# Patient Record
Sex: MALE | Race: WHITE | Employment: OTHER | URBAN - METROPOLITAN AREA
[De-identification: names, ages, dates, MRNs, and addresses within clinical notes are randomized per-mention and may not be internally consistent; named-entity substitution may affect disease eponyms.]

---

## 2017-01-03 ENCOUNTER — TELEPHONE (OUTPATIENT)
Dept: INTERNAL MEDICINE CLINIC | Facility: CLINIC | Age: 67
End: 2017-01-03

## 2017-01-03 NOTE — TELEPHONE ENCOUNTER
Spoke with patient. He reports he was seeing urologist and mentioned abdominal discomfort. Urologist noted 2 hernia--large one on L and small one on R.  Patient states when he is not exercising he has no pain, but if he goes running it takes him about 5 day

## 2017-01-05 NOTE — TELEPHONE ENCOUNTER
Pt seen by urologist Dr Monica Mccall and told he has bilateral inguinal hernias (small on right, large on left);  Pt advised for general surgery referral to Dr Justyna Garcia; pt called

## 2017-01-06 PROBLEM — K40.20 BILATERAL INGUINAL HERNIA WITHOUT OBSTRUCTION OR GANGRENE, RECURRENCE NOT SPECIFIED: Status: ACTIVE | Noted: 2017-01-06

## 2017-01-23 RX ORDER — ACETAMINOPHEN 325 MG/1
650 TABLET ORAL ONCE
Status: DISCONTINUED | OUTPATIENT
Start: 2017-01-23 | End: 2017-01-23

## 2017-01-24 ENCOUNTER — ANESTHESIA EVENT (OUTPATIENT)
Dept: SURGERY | Facility: HOSPITAL | Age: 67
End: 2017-01-24
Payer: MEDICARE

## 2017-01-24 ENCOUNTER — HOSPITAL ENCOUNTER (OUTPATIENT)
Facility: HOSPITAL | Age: 67
Setting detail: HOSPITAL OUTPATIENT SURGERY
Discharge: HOME | End: 2017-01-24
Attending: SURGERY | Admitting: SURGERY
Payer: MEDICARE

## 2017-01-24 ENCOUNTER — ANESTHESIA (OUTPATIENT)
Dept: SURGERY | Facility: HOSPITAL | Age: 67
End: 2017-01-24
Payer: MEDICARE

## 2017-01-24 ENCOUNTER — SURGERY (OUTPATIENT)
Age: 67
End: 2017-01-24

## 2017-01-24 VITALS
BODY MASS INDEX: 30.52 KG/M2 | SYSTOLIC BLOOD PRESSURE: 121 MMHG | HEIGHT: 71 IN | OXYGEN SATURATION: 91 % | DIASTOLIC BLOOD PRESSURE: 65 MMHG | WEIGHT: 218 LBS | TEMPERATURE: 99 F | HEART RATE: 83 BPM | RESPIRATION RATE: 18 BRPM

## 2017-01-24 DIAGNOSIS — K40.20 BILATERAL INGUINAL HERNIA WITHOUT OBSTRUCTION OR GANGRENE, RECURRENCE NOT SPECIFIED: Primary | ICD-10-CM

## 2017-01-24 PROCEDURE — 0YUA0JZ SUPPLEMENT BILATERAL INGUINAL REGION WITH SYNTHETIC SUBSTITUTE, OPEN APPROACH: ICD-10-PCS | Performed by: SURGERY

## 2017-01-24 PROCEDURE — C9290 INJ, BUPIVACAINE LIPOSOME: HCPCS | Performed by: SURGERY

## 2017-01-24 DEVICE — BARD 3DMAX MESH RIGHT LARGE
Type: IMPLANTABLE DEVICE | Site: INGUINAL | Status: FUNCTIONAL
Brand: BARD 3DMAX MESH

## 2017-01-24 DEVICE — BARD 3DMAX MESH LEFT LARGE
Type: IMPLANTABLE DEVICE | Site: INGUINAL | Status: FUNCTIONAL
Brand: BARD 3DMAX MESH

## 2017-01-24 RX ORDER — GLYCOPYRROLATE 0.2 MG/ML
INJECTION INTRAMUSCULAR; INTRAVENOUS AS NEEDED
Status: DISCONTINUED | OUTPATIENT
Start: 2017-01-24 | End: 2017-01-24 | Stop reason: SURG

## 2017-01-24 RX ORDER — LIDOCAINE HYDROCHLORIDE 10 MG/ML
INJECTION, SOLUTION EPIDURAL; INFILTRATION; INTRACAUDAL; PERINEURAL AS NEEDED
Status: DISCONTINUED | OUTPATIENT
Start: 2017-01-24 | End: 2017-01-24 | Stop reason: SURG

## 2017-01-24 RX ORDER — MIDAZOLAM HYDROCHLORIDE 1 MG/ML
INJECTION INTRAMUSCULAR; INTRAVENOUS AS NEEDED
Status: DISCONTINUED | OUTPATIENT
Start: 2017-01-24 | End: 2017-01-24 | Stop reason: SURG

## 2017-01-24 RX ORDER — DEXAMETHASONE SODIUM PHOSPHATE 4 MG/ML
VIAL (ML) INJECTION AS NEEDED
Status: DISCONTINUED | OUTPATIENT
Start: 2017-01-24 | End: 2017-01-24 | Stop reason: SURG

## 2017-01-24 RX ORDER — MORPHINE SULFATE 4 MG/ML
4 INJECTION, SOLUTION INTRAMUSCULAR; INTRAVENOUS EVERY 2 HOUR PRN
Status: DISCONTINUED | OUTPATIENT
Start: 2017-01-24 | End: 2017-01-24

## 2017-01-24 RX ORDER — HEPARIN SODIUM 5000 [USP'U]/ML
5000 INJECTION, SOLUTION INTRAVENOUS; SUBCUTANEOUS ONCE
Status: COMPLETED | OUTPATIENT
Start: 2017-01-24 | End: 2017-01-24

## 2017-01-24 RX ORDER — MORPHINE SULFATE 4 MG/ML
6 INJECTION, SOLUTION INTRAMUSCULAR; INTRAVENOUS EVERY 2 HOUR PRN
Status: DISCONTINUED | OUTPATIENT
Start: 2017-01-24 | End: 2017-01-24

## 2017-01-24 RX ORDER — HYDROMORPHONE HYDROCHLORIDE 1 MG/ML
0.4 INJECTION, SOLUTION INTRAMUSCULAR; INTRAVENOUS; SUBCUTANEOUS EVERY 5 MIN PRN
Status: DISCONTINUED | OUTPATIENT
Start: 2017-01-24 | End: 2017-01-24

## 2017-01-24 RX ORDER — BUPIVACAINE HYDROCHLORIDE AND EPINEPHRINE 2.5; 5 MG/ML; UG/ML
INJECTION, SOLUTION INFILTRATION; PERINEURAL AS NEEDED
Status: DISCONTINUED | OUTPATIENT
Start: 2017-01-24 | End: 2017-01-24 | Stop reason: HOSPADM

## 2017-01-24 RX ORDER — MORPHINE SULFATE 2 MG/ML
2 INJECTION, SOLUTION INTRAMUSCULAR; INTRAVENOUS EVERY 2 HOUR PRN
Status: DISCONTINUED | OUTPATIENT
Start: 2017-01-24 | End: 2017-01-24

## 2017-01-24 RX ORDER — MORPHINE SULFATE 4 MG/ML
4 INJECTION, SOLUTION INTRAMUSCULAR; INTRAVENOUS EVERY 10 MIN PRN
Status: DISCONTINUED | OUTPATIENT
Start: 2017-01-24 | End: 2017-01-24

## 2017-01-24 RX ORDER — NALOXONE HYDROCHLORIDE 0.4 MG/ML
80 INJECTION, SOLUTION INTRAMUSCULAR; INTRAVENOUS; SUBCUTANEOUS AS NEEDED
Status: DISCONTINUED | OUTPATIENT
Start: 2017-01-24 | End: 2017-01-24

## 2017-01-24 RX ORDER — MORPHINE SULFATE 10 MG/ML
6 INJECTION, SOLUTION INTRAMUSCULAR; INTRAVENOUS EVERY 10 MIN PRN
Status: DISCONTINUED | OUTPATIENT
Start: 2017-01-24 | End: 2017-01-24

## 2017-01-24 RX ORDER — SODIUM CHLORIDE, SODIUM LACTATE, POTASSIUM CHLORIDE, CALCIUM CHLORIDE 600; 310; 30; 20 MG/100ML; MG/100ML; MG/100ML; MG/100ML
INJECTION, SOLUTION INTRAVENOUS CONTINUOUS
Status: DISCONTINUED | OUTPATIENT
Start: 2017-01-24 | End: 2017-01-24

## 2017-01-24 RX ORDER — HYDROCODONE BITARTRATE AND ACETAMINOPHEN 5; 325 MG/1; MG/1
1 TABLET ORAL AS NEEDED
Status: DISCONTINUED | OUTPATIENT
Start: 2017-01-24 | End: 2017-01-24

## 2017-01-24 RX ORDER — FAMOTIDINE 20 MG/1
20 TABLET ORAL ONCE
Status: COMPLETED | OUTPATIENT
Start: 2017-01-24 | End: 2017-01-24

## 2017-01-24 RX ORDER — LABETALOL HYDROCHLORIDE 5 MG/ML
INJECTION, SOLUTION INTRAVENOUS AS NEEDED
Status: DISCONTINUED | OUTPATIENT
Start: 2017-01-24 | End: 2017-01-24 | Stop reason: SURG

## 2017-01-24 RX ORDER — ACETAMINOPHEN 10 MG/ML
1000 INJECTION, SOLUTION INTRAVENOUS ONCE
Status: DISCONTINUED | OUTPATIENT
Start: 2017-01-24 | End: 2017-01-24 | Stop reason: HOSPADM

## 2017-01-24 RX ORDER — ACETAMINOPHEN 10 MG/ML
INJECTION, SOLUTION INTRAVENOUS AS NEEDED
Status: DISCONTINUED | OUTPATIENT
Start: 2017-01-24 | End: 2017-01-24 | Stop reason: SURG

## 2017-01-24 RX ORDER — DOCUSATE SODIUM 100 MG/1
100 CAPSULE, LIQUID FILLED ORAL 2 TIMES DAILY
Qty: 14 CAPSULE | Refills: 1 | Status: SHIPPED | OUTPATIENT
Start: 2017-01-24 | End: 2017-01-31

## 2017-01-24 RX ORDER — MORPHINE SULFATE 2 MG/ML
2 INJECTION, SOLUTION INTRAMUSCULAR; INTRAVENOUS EVERY 10 MIN PRN
Status: DISCONTINUED | OUTPATIENT
Start: 2017-01-24 | End: 2017-01-24

## 2017-01-24 RX ORDER — ROCURONIUM BROMIDE 10 MG/ML
INJECTION, SOLUTION INTRAVENOUS AS NEEDED
Status: DISCONTINUED | OUTPATIENT
Start: 2017-01-24 | End: 2017-01-24 | Stop reason: SURG

## 2017-01-24 RX ORDER — ONDANSETRON 2 MG/ML
4 INJECTION INTRAMUSCULAR; INTRAVENOUS ONCE AS NEEDED
Status: DISCONTINUED | OUTPATIENT
Start: 2017-01-24 | End: 2017-01-24

## 2017-01-24 RX ORDER — TRAMADOL HYDROCHLORIDE 50 MG/1
100 TABLET ORAL EVERY 6 HOURS PRN
Status: DISCONTINUED | OUTPATIENT
Start: 2017-01-24 | End: 2017-01-24

## 2017-01-24 RX ORDER — HYDROMORPHONE HYDROCHLORIDE 1 MG/ML
0.2 INJECTION, SOLUTION INTRAMUSCULAR; INTRAVENOUS; SUBCUTANEOUS EVERY 5 MIN PRN
Status: DISCONTINUED | OUTPATIENT
Start: 2017-01-24 | End: 2017-01-24

## 2017-01-24 RX ORDER — SODIUM CHLORIDE, SODIUM LACTATE, POTASSIUM CHLORIDE, CALCIUM CHLORIDE 600; 310; 30; 20 MG/100ML; MG/100ML; MG/100ML; MG/100ML
INJECTION, SOLUTION INTRAVENOUS CONTINUOUS PRN
Status: DISCONTINUED | OUTPATIENT
Start: 2017-01-24 | End: 2017-01-24 | Stop reason: SURG

## 2017-01-24 RX ORDER — HYDROCODONE BITARTRATE AND ACETAMINOPHEN 5; 325 MG/1; MG/1
2 TABLET ORAL AS NEEDED
Status: DISCONTINUED | OUTPATIENT
Start: 2017-01-24 | End: 2017-01-24

## 2017-01-24 RX ORDER — HYDROMORPHONE HYDROCHLORIDE 1 MG/ML
0.6 INJECTION, SOLUTION INTRAMUSCULAR; INTRAVENOUS; SUBCUTANEOUS EVERY 5 MIN PRN
Status: DISCONTINUED | OUTPATIENT
Start: 2017-01-24 | End: 2017-01-24

## 2017-01-24 RX ORDER — METOCLOPRAMIDE 10 MG/1
10 TABLET ORAL ONCE
Status: COMPLETED | OUTPATIENT
Start: 2017-01-24 | End: 2017-01-24

## 2017-01-24 RX ORDER — ONDANSETRON 2 MG/ML
INJECTION INTRAMUSCULAR; INTRAVENOUS AS NEEDED
Status: DISCONTINUED | OUTPATIENT
Start: 2017-01-24 | End: 2017-01-24 | Stop reason: SURG

## 2017-01-24 RX ORDER — NEOSTIGMINE METHYLSULFATE 0.5 MG/ML
INJECTION INTRAVENOUS AS NEEDED
Status: DISCONTINUED | OUTPATIENT
Start: 2017-01-24 | End: 2017-01-24 | Stop reason: SURG

## 2017-01-24 RX ORDER — HYDROCODONE BITARTRATE AND ACETAMINOPHEN 7.5; 325 MG/1; MG/1
1-2 TABLET ORAL EVERY 6 HOURS PRN
Qty: 30 TABLET | Refills: 0 | Status: SHIPPED | OUTPATIENT
Start: 2017-01-24 | End: 2017-08-07 | Stop reason: ALTCHOICE

## 2017-01-24 RX ADMIN — DEXAMETHASONE SODIUM PHOSPHATE 4 MG: 4 MG/ML VIAL (ML) INJECTION at 12:45:00

## 2017-01-24 RX ADMIN — MIDAZOLAM HYDROCHLORIDE 2 MG: 1 INJECTION INTRAMUSCULAR; INTRAVENOUS at 12:35:00

## 2017-01-24 RX ADMIN — SODIUM CHLORIDE, SODIUM LACTATE, POTASSIUM CHLORIDE, CALCIUM CHLORIDE: 600; 310; 30; 20 INJECTION, SOLUTION INTRAVENOUS at 15:45:00

## 2017-01-24 RX ADMIN — LABETALOL HYDROCHLORIDE 5 MG: 5 INJECTION, SOLUTION INTRAVENOUS at 16:20:00

## 2017-01-24 RX ADMIN — ACETAMINOPHEN 1000 MG: 10 INJECTION, SOLUTION INTRAVENOUS at 16:03:00

## 2017-01-24 RX ADMIN — SODIUM CHLORIDE, SODIUM LACTATE, POTASSIUM CHLORIDE, CALCIUM CHLORIDE: 600; 310; 30; 20 INJECTION, SOLUTION INTRAVENOUS at 16:34:00

## 2017-01-24 RX ADMIN — GLYCOPYRROLATE 0.5 MG: 0.2 INJECTION INTRAMUSCULAR; INTRAVENOUS at 16:05:00

## 2017-01-24 RX ADMIN — SODIUM CHLORIDE, SODIUM LACTATE, POTASSIUM CHLORIDE, CALCIUM CHLORIDE: 600; 310; 30; 20 INJECTION, SOLUTION INTRAVENOUS at 12:35:00

## 2017-01-24 RX ADMIN — ONDANSETRON 4 MG: 2 INJECTION INTRAMUSCULAR; INTRAVENOUS at 16:03:00

## 2017-01-24 RX ADMIN — LIDOCAINE HYDROCHLORIDE 50 MG: 10 INJECTION, SOLUTION EPIDURAL; INFILTRATION; INTRACAUDAL; PERINEURAL at 12:40:00

## 2017-01-24 RX ADMIN — ROCURONIUM BROMIDE 10 MG: 10 INJECTION, SOLUTION INTRAVENOUS at 13:30:00

## 2017-01-24 RX ADMIN — ROCURONIUM BROMIDE 50 MG: 10 INJECTION, SOLUTION INTRAVENOUS at 12:40:00

## 2017-01-24 RX ADMIN — ROCURONIUM BROMIDE 10 MG: 10 INJECTION, SOLUTION INTRAVENOUS at 15:15:00

## 2017-01-24 RX ADMIN — LABETALOL HYDROCHLORIDE 2.5 MG: 5 INJECTION, SOLUTION INTRAVENOUS at 13:43:00

## 2017-01-24 RX ADMIN — LABETALOL HYDROCHLORIDE 5 MG: 5 INJECTION, SOLUTION INTRAVENOUS at 16:26:00

## 2017-01-24 RX ADMIN — NEOSTIGMINE METHYLSULFATE 3 MG: 0.5 INJECTION INTRAVENOUS at 16:05:00

## 2017-01-24 NOTE — ANESTHESIA PREPROCEDURE EVALUATION
Anesthesia PreOp Note    HPI:     Miah Castillo is a 73year old male who presents for preoperative consultation requested by: Kishan Mehta MD    Date of Surgery: 1/24/2017    Procedure(s):  ROBOT-ASSISTED LAP INGUINAL HERNIA REPAIR  Indication: B Prescriptions prior to admission:  Albuterol Sulfate HFA (VENTOLIN HFA) 108 (90 BASE) MCG/ACT Inhalation Aero Soln Inhale 2 puffs into the lungs every 6 (six) hours as needed for Wheezing.  Disp: 1 Inhaler Rfl: 5 Past Month at Unknown time   aspirin Drug Use: No    Sexual Activity: Not on file   Not on file  Other Topics Concern     Service No    Caffeine Concern Yes    Comment: 3 cups coffee per day    Occupational Exposure No     Social History Narrative       Available pre-op labs reviewed.

## 2017-01-24 NOTE — CONSULTS
Alondra Bishop is a 73year old  male.    Patient presents with:  Consult: vane on Bilateral hernia      HPI:     The patient complains of bilateral inguinal hernias. Signs and symptoms of hernia were first noticed 2-3 months.  Aggravating factors inclu SURGICAL HISTORY          Comment  Right bunion surgery with Dr. Frantz Hanna Age of Onset    •  Heart Disorder  Father  45        MI    •  [facility]    •  Other[Other    GENITAL/: normal external genitalia  LYMPHATIC: no lymphadenopathy  EXTREMITIES: no cyanosis, clubbing or edema, peripheral pulses intact  NEUROLOGIC: intact; no sensorimotor deficit; reflexes normal      ASSESSMENT/ PLAN:    This is a 73year old  ma patient's records as well as evaluation by Dr. Antony Crooks MD    [facility]

## 2017-01-24 NOTE — H&P (VIEW-ONLY)
Kofi Parker is a 73year old  male.    Patient presents with:  Consult: vane on Bilateral hernia      HPI:     The patient complains of bilateral inguinal hernias. Signs and symptoms of hernia were first noticed 2-3 months.  Aggravating factors inclu SURGICAL HISTORY          Comment  Right bunion surgery with Dr. Echols Dural Age of Onset    •  Heart Disorder  Father  45        MI    •  [address] [facility]    •  Other[Other    GENITAL/: normal external genitalia  LYMPHATIC: no lymphadenopathy  EXTREMITIES: no cyanosis, clubbing or edema, peripheral pulses intact  NEUROLOGIC: intact; no sensorimotor deficit; reflexes normal      ASSESSMENT/ PLAN:    This is a 73year old  ma patient's records as well as evaluation by Dr. Patricio Diaz MD    [facility]

## 2017-01-24 NOTE — BRIEF OP NOTE
Marcy 46  Brief Op Note     Michelle Anderson Location: OR   [facility] [id number] MRN [id number]   Admission Date 1/24/2017 Operation Date 1/24/2017   Attending Physician Diane Ceballos MD Operating Physician Jaguar Vargas MD

## 2017-01-24 NOTE — INTERVAL H&P NOTE
Pre-op Diagnosis: BILATERAL INGUINAL HERNIA     The above referenced H&P was reviewed by Regina Whaley MD on 1/24/2017, the patient was examined and no significant changes have occurred in the patient's condition since the H&P was performed.   I discus

## 2017-01-24 NOTE — ANESTHESIA POSTPROCEDURE EVALUATION
Patient: Jacoby Born    Procedure Summary     Date Anesthesia Start Anesthesia Stop Room / Location    01/24/17 1235  [address] [facility] MAIN OR 07 / [facility] MAIN OR       Procedure Diagnosis Surgeon Responsible Provider    ROBOT-ASSISTED LAP INGUINAL HERNIA REPAIR ([place]

## 2017-01-25 NOTE — OPERATIVE REPORT
[facility]    PATIENT'S NAME: Reg Stewartco   ATTENDING PHYSICIAN: Allison Cho MD   OPERATING PHYSICIAN: Allison Cho MD   PATIENT ACCOUNT#:   [de-identified]    LOCATION:  [address]  MEDICAL RECORD #:   [id number] placed in the umbilicus. Exploration of all 4 quadrants reveals no bowel injury or vascular injury present. Next, two 8 mm robotic trocars were placed in the right and left flank regions under direct visualization. Patient was placed in Trendelenburg. pneumoperitoneum was removed, as well as the air was removed from the scrotum, prior to removing the 8 mm trocar sites. The wounds were irrigated thoroughly with saline solution. There was no active bleeding present.   The skin was approximated with a 4-0

## 2017-06-29 ENCOUNTER — OFFICE VISIT (OUTPATIENT)
Dept: INTERNAL MEDICINE CLINIC | Facility: CLINIC | Age: 67
End: 2017-06-29

## 2017-06-29 VITALS
TEMPERATURE: 98 F | DIASTOLIC BLOOD PRESSURE: 74 MMHG | BODY MASS INDEX: 30.19 KG/M2 | WEIGHT: 215.63 LBS | OXYGEN SATURATION: 98 % | SYSTOLIC BLOOD PRESSURE: 108 MMHG | HEART RATE: 58 BPM | HEIGHT: 70.75 IN

## 2017-06-29 DIAGNOSIS — D22.9 NEVUS: ICD-10-CM

## 2017-06-29 DIAGNOSIS — R53.83 OTHER FATIGUE: ICD-10-CM

## 2017-06-29 DIAGNOSIS — E78.00 HYPERCHOLESTEREMIA: ICD-10-CM

## 2017-06-29 DIAGNOSIS — Z00.00 PHYSICAL EXAM, ANNUAL: Primary | ICD-10-CM

## 2017-06-29 DIAGNOSIS — K63.5 POLYP OF COLON, UNSPECIFIED PART OF COLON, UNSPECIFIED TYPE: ICD-10-CM

## 2017-06-29 PROCEDURE — G0402 INITIAL PREVENTIVE EXAM: HCPCS | Performed by: INTERNAL MEDICINE

## 2017-06-29 NOTE — PROGRESS NOTES
Elena Luu is a 73year old male. HPI:   Patient presents with:  Physical: annual. Has a family HX of DM, questioning if he should be doing any testing.   Follow - Up: states he twisted his left ankle on saturday, initially had pain but denies any p Packs/day: 0.00      Years: 20.00        Types: Pipe     Quit date: 12/13/1995  Smokeless tobacco: Never Used                      Alcohol use:  Yes              Comment: One drink daily       Medications (Active prior to toda depressed, or hopeless (over the last two weeks)?: Not at all    PHQ-2 SCORE: 0        Advance Directives                Hearing Assessment (Required for AWV/SWV)    Whispered Voice    Visual Acuity                   Cognitive Assessment applicable    Hepatitis B No orders found for this or any previous visit. Update Immunization Activity if applicable    Tetanus No orders found for this or any previous visit.  Update Immunization Activity if applicable    Zoster (Not covered by Medicare [place] appetite, diarrhea and vomiting; no melena or hematochezia  Musculoskeletal:  Negative for arthralgias or myalgias  Genitourinary:  Negative for dysuria or polyuria  Hema/Lymph:  Negative for easy bleeding and easy bruising  Integumentary:  Negative for pr in [facility]; sees every December     Family history of CAD  Last stress test in 2012     Colon polyps  Pt had colonoscopy on 1/4/10, and again in 2015 (per pt; report not available) per Dr Kristy Osborne; pt due again in 2020 for repeat colonoscopy     Large nevus

## 2017-07-13 ENCOUNTER — LAB ENCOUNTER (OUTPATIENT)
Dept: LAB | Age: 67
End: 2017-07-13
Attending: INTERNAL MEDICINE
Payer: MEDICARE

## 2017-07-13 DIAGNOSIS — E78.00 HYPERCHOLESTEREMIA: ICD-10-CM

## 2017-07-13 DIAGNOSIS — R53.83 OTHER FATIGUE: ICD-10-CM

## 2017-07-13 LAB
ALBUMIN SERPL BCP-MCNC: 4.3 G/DL (ref 3.5–4.8)
ALBUMIN/GLOB SERPL: 1.4 {RATIO} (ref 1–2)
ALP SERPL-CCNC: 47 U/L (ref 32–100)
ALT SERPL-CCNC: 19 U/L (ref 17–63)
ANION GAP SERPL CALC-SCNC: 7 MMOL/L (ref 0–18)
AST SERPL-CCNC: 21 U/L (ref 15–41)
BASOPHILS # BLD: 0.1 K/UL (ref 0–0.2)
BASOPHILS NFR BLD: 1 %
BILIRUB SERPL-MCNC: 1.2 MG/DL (ref 0.3–1.2)
BUN SERPL-MCNC: 18 MG/DL (ref 8–20)
BUN/CREAT SERPL: 15.3 (ref 10–20)
CALCIUM SERPL-MCNC: 9.4 MG/DL (ref 8.5–10.5)
CHLORIDE SERPL-SCNC: 109 MMOL/L (ref 95–110)
CHOLEST SERPL-MCNC: 204 MG/DL (ref 110–200)
CO2 SERPL-SCNC: 28 MMOL/L (ref 22–32)
CREAT SERPL-MCNC: 1.18 MG/DL (ref 0.5–1.5)
EOSINOPHIL # BLD: 0.2 K/UL (ref 0–0.7)
EOSINOPHIL NFR BLD: 4 %
ERYTHROCYTE [DISTWIDTH] IN BLOOD BY AUTOMATED COUNT: 14.4 % (ref 11–15)
GLOBULIN PLAS-MCNC: 3 G/DL (ref 2.5–3.7)
GLUCOSE SERPL-MCNC: 99 MG/DL (ref 70–99)
HCT VFR BLD AUTO: 46.3 % (ref 41–52)
HDLC SERPL-MCNC: 62 MG/DL
HGB BLD-MCNC: 15.8 G/DL (ref 13.5–17.5)
LDLC SERPL CALC-MCNC: 134 MG/DL (ref 0–99)
LYMPHOCYTES # BLD: 2 K/UL (ref 1–4)
LYMPHOCYTES NFR BLD: 30 %
MCH RBC QN AUTO: 31.3 PG (ref 27–32)
MCHC RBC AUTO-ENTMCNC: 34.3 G/DL (ref 32–37)
MCV RBC AUTO: 91.3 FL (ref 80–100)
MONOCYTES # BLD: 0.6 K/UL (ref 0–1)
MONOCYTES NFR BLD: 10 %
NEUTROPHILS # BLD AUTO: 3.7 K/UL (ref 1.8–7.7)
NEUTROPHILS NFR BLD: 56 %
NONHDLC SERPL-MCNC: 142 MG/DL
OSMOLALITY UR CALC.SUM OF ELEC: 300 MOSM/KG (ref 275–295)
PLATELET # BLD AUTO: 250 K/UL (ref 140–400)
PMV BLD AUTO: 9.4 FL (ref 7.4–10.3)
POTASSIUM SERPL-SCNC: 5 MMOL/L (ref 3.3–5.1)
PROT SERPL-MCNC: 7.3 G/DL (ref 5.9–8.4)
RBC # BLD AUTO: 5.07 M/UL (ref 4.5–5.9)
SODIUM SERPL-SCNC: 144 MMOL/L (ref 136–144)
TRIGL SERPL-MCNC: 41 MG/DL (ref 1–149)
TSH SERPL-ACNC: 2.43 UIU/ML (ref 0.45–5.33)
WBC # BLD AUTO: 6.6 K/UL (ref 4–11)

## 2017-07-13 PROCEDURE — 36415 COLL VENOUS BLD VENIPUNCTURE: CPT

## 2017-07-13 PROCEDURE — 80053 COMPREHEN METABOLIC PANEL: CPT

## 2017-07-13 PROCEDURE — 80061 LIPID PANEL: CPT

## 2017-07-13 PROCEDURE — 84443 ASSAY THYROID STIM HORMONE: CPT

## 2017-07-13 PROCEDURE — 85025 COMPLETE CBC W/AUTO DIFF WBC: CPT

## 2017-08-07 ENCOUNTER — OFFICE VISIT (OUTPATIENT)
Dept: DERMATOLOGY CLINIC | Facility: CLINIC | Age: 67
End: 2017-08-07

## 2017-08-07 DIAGNOSIS — D23.60 BENIGN NEOPLASM OF SKIN OF UPPER LIMB, INCLUDING SHOULDER, UNSPECIFIED LATERALITY: ICD-10-CM

## 2017-08-07 DIAGNOSIS — D23.4 BENIGN NEOPLASM OF SCALP AND SKIN OF NECK: ICD-10-CM

## 2017-08-07 DIAGNOSIS — D23.5 BENIGN NEOPLASM OF SKIN OF TRUNK, EXCEPT SCROTUM: ICD-10-CM

## 2017-08-07 DIAGNOSIS — D23.30 BENIGN NEOPLASM OF SKIN OF FACE: ICD-10-CM

## 2017-08-07 DIAGNOSIS — D23.70 BENIGN NEOPLASM OF SKIN OF LOWER LIMB, INCLUDING HIP, UNSPECIFIED LATERALITY: ICD-10-CM

## 2017-08-07 DIAGNOSIS — D22.9 MULTIPLE NEVI: Primary | ICD-10-CM

## 2017-08-07 PROCEDURE — G0463 HOSPITAL OUTPT CLINIC VISIT: HCPCS | Performed by: DERMATOLOGY

## 2017-08-07 PROCEDURE — 99202 OFFICE O/P NEW SF 15 MIN: CPT | Performed by: DERMATOLOGY

## 2017-08-07 RX ORDER — AMOXICILLIN 500 MG/1
CAPSULE ORAL
Refills: 0 | COMMUNITY
Start: 2017-06-29 | End: 2017-08-07 | Stop reason: ALTCHOICE

## 2017-08-07 RX ORDER — BIOTIN 1 MG
1 TABLET ORAL DAILY
COMMUNITY

## 2017-08-20 NOTE — PROGRESS NOTES
Bobo Roberts is a 73year old male. HPI:     CC:  Patient presents with:  Lesion: New pt presenting with lesions to R temple and abdomen. Pt denies personal and family hx of skin cancer.         Allergies:  Review of patient's allergies indicates no kno D3) 1000 units Oral Cap Take 1 tablet by mouth daily. Disp:  Rfl:    aspirin 81 MG Oral Tab Take 81 mg by mouth daily. Disp:  Rfl:    Omega-3 Fatty Acids (FISH OIL) 1200 MG Oral Cap Take 1 capsule by mouth daily.    Disp:  Rfl: 0   Multiple Vitamins-Mineral Exposure No    Pt has a pacemaker No    Pt has a defibrillator No    Reaction to local anesthetic No     Social History Narrative   None on file     Family History   Problem Relation Age of Onset   • Heart Disorder Father 39     MI   • Diabetes Father    • history and physical exam also:    Assessment / plan:    No orders of the defined types were placed in this encounter.       Meds & Refills for this Visit:  No prescriptions requested or ordered in this encounter         Multiple nevi  (primary encounter di

## 2017-09-28 ENCOUNTER — HOSPITAL ENCOUNTER (OUTPATIENT)
Dept: CT IMAGING | Facility: HOSPITAL | Age: 67
Discharge: HOME | End: 2017-09-28
Attending: INTERNAL MEDICINE

## 2017-09-28 VITALS — HEART RATE: 45 BPM | DIASTOLIC BLOOD PRESSURE: 66 MMHG | SYSTOLIC BLOOD PRESSURE: 106 MMHG

## 2017-09-28 DIAGNOSIS — Z13.220 SCREENING CHOLESTEROL LEVEL: ICD-10-CM

## 2017-09-28 PROCEDURE — 75571 CT HRT W/O DYE W/CA TEST: CPT | Performed by: INTERNAL MEDICINE

## 2017-09-28 NOTE — IMAGING NOTE
Pt here for calcium score pt has had one done apporox every 7 years prevoius scores were 0 today was 1.21 total choles was 169 hdl=52  ldl n/a previous ldl was  134  Trg=less than 45  In July WAS 41and glucose was 93 .  Teaching provided questions answered

## 2018-07-11 ENCOUNTER — OFFICE VISIT (OUTPATIENT)
Dept: INTERNAL MEDICINE CLINIC | Facility: CLINIC | Age: 68
End: 2018-07-11

## 2018-07-11 VITALS
HEIGHT: 70.75 IN | SYSTOLIC BLOOD PRESSURE: 110 MMHG | DIASTOLIC BLOOD PRESSURE: 60 MMHG | OXYGEN SATURATION: 98 % | WEIGHT: 217 LBS | BODY MASS INDEX: 30.38 KG/M2 | HEART RATE: 56 BPM | TEMPERATURE: 98 F

## 2018-07-11 DIAGNOSIS — Z00.00 PHYSICAL EXAM, ANNUAL: Primary | ICD-10-CM

## 2018-07-11 DIAGNOSIS — R53.83 OTHER FATIGUE: ICD-10-CM

## 2018-07-11 DIAGNOSIS — J45.20 MILD INTERMITTENT ASTHMA WITHOUT COMPLICATION: ICD-10-CM

## 2018-07-11 DIAGNOSIS — Z82.49 FAMILY HISTORY OF CORONARY ARTERY DISEASE: ICD-10-CM

## 2018-07-11 DIAGNOSIS — K63.5 POLYP OF COLON, UNSPECIFIED PART OF COLON, UNSPECIFIED TYPE: ICD-10-CM

## 2018-07-11 DIAGNOSIS — Z86.711 HISTORY OF PULMONARY EMBOLUS (PE): ICD-10-CM

## 2018-07-11 DIAGNOSIS — D22.9 NEVUS: ICD-10-CM

## 2018-07-11 DIAGNOSIS — I70.0 AORTIC ATHEROSCLEROSIS (HCC): ICD-10-CM

## 2018-07-11 DIAGNOSIS — Z12.5 SCREENING PSA (PROSTATE SPECIFIC ANTIGEN): ICD-10-CM

## 2018-07-11 DIAGNOSIS — E78.00 HYPERCHOLESTEREMIA: ICD-10-CM

## 2018-07-11 PROBLEM — K40.20 BILATERAL INGUINAL HERNIA WITHOUT OBSTRUCTION OR GANGRENE, RECURRENCE NOT SPECIFIED: Status: RESOLVED | Noted: 2017-01-06 | Resolved: 2018-07-11

## 2018-07-11 PROCEDURE — 90471 IMMUNIZATION ADMIN: CPT | Performed by: INTERNAL MEDICINE

## 2018-07-11 PROCEDURE — G0438 PPPS, INITIAL VISIT: HCPCS | Performed by: INTERNAL MEDICINE

## 2018-07-11 PROCEDURE — 90734 MENACWYD/MENACWYCRM VACC IM: CPT | Performed by: INTERNAL MEDICINE

## 2018-07-11 RX ORDER — ATOVAQUONE AND PROGUANIL HYDROCHLORIDE 250; 100 MG/1; MG/1
1 TABLET, FILM COATED ORAL DAILY
Qty: 16 TABLET | Refills: 0 | Status: SHIPPED | OUTPATIENT
Start: 2018-07-11 | End: 2019-06-17 | Stop reason: ALTCHOICE

## 2018-07-11 RX ORDER — CIPROFLOXACIN 500 MG/1
500 TABLET, FILM COATED ORAL 2 TIMES DAILY
Qty: 14 TABLET | Refills: 0 | Status: SHIPPED | OUTPATIENT
Start: 2018-07-11 | End: 2019-06-17 | Stop reason: ALTCHOICE

## 2018-07-11 NOTE — PROGRESS NOTES
Maritza Pacheco is a 78year old male.     HPI:   Patient presents with:  Physical: annual medicare physical      79 y/o M here for subsequent Medicare annual wellness exam;  no CP; no SOB; no headaches; no palpitation; travelling to [place] in Jan 2019 for prior to today's visit):    Current Outpatient Prescriptions:  Cholecalciferol (VITAMIN D3) 1000 units Oral Cap Take 1 tablet by mouth daily.  Disp:  Rfl:    Albuterol Sulfate HFA (VENTOLIN HFA) 108 (90 BASE) MCG/ACT Inhalation Aero Soln Inhale 2 puffs into months?: 0-No                                        Fall/Risk Scorin          Depression Screening (PHQ-2/PHQ-9): Over the LAST 2 WEEKS   Little interest or pleasure in doing things (over the last two weeks)?: Not at all    Feeling down, depressed, or every 5 years No results found for this or any previous visit. No flowsheet data found. Fecal Occult Blood Annually No results found for: FOB, OCCULTSTOOL No flowsheet data found.     Glaucoma Screening      Ophthalmology Visit Annually     Prostate Can 08/24/2016 153 (H)    No flowsheet data found. Dilated Eye exam  Annually No flowsheet data found. No flowsheet data found. COPD      Spirometry Testing Annually No results found for this or any previous visit. No flowsheet data found. mid-abdomen; 15 x 12 mm in size         ASSESSMENT/PLAN:   Physical exam  Travel advice for travel to [place] in Jan 2019:  Malarone one tab po qD, Vivotif one cap po qOD x4d, Cipro 500 mg po BID x7d prn travellers; diarrhea; Menveo x one dose now; advise He

## 2018-07-12 ENCOUNTER — LAB ENCOUNTER (OUTPATIENT)
Dept: LAB | Age: 68
End: 2018-07-12
Attending: INTERNAL MEDICINE
Payer: MEDICARE

## 2018-07-12 DIAGNOSIS — R53.83 OTHER FATIGUE: ICD-10-CM

## 2018-07-12 DIAGNOSIS — E78.00 HYPERCHOLESTEREMIA: ICD-10-CM

## 2018-07-12 LAB
ALBUMIN SERPL BCP-MCNC: 4.1 G/DL (ref 3.5–4.8)
ALBUMIN/GLOB SERPL: 1.6 {RATIO} (ref 1–2)
ALP SERPL-CCNC: 42 U/L (ref 32–100)
ALT SERPL-CCNC: 22 U/L (ref 17–63)
ANION GAP SERPL CALC-SCNC: 5 MMOL/L (ref 0–18)
AST SERPL-CCNC: 21 U/L (ref 15–41)
BASOPHILS # BLD: 0 K/UL (ref 0–0.2)
BASOPHILS NFR BLD: 1 %
BILIRUB SERPL-MCNC: 1.2 MG/DL (ref 0.3–1.2)
BUN SERPL-MCNC: 20 MG/DL (ref 8–20)
BUN/CREAT SERPL: 19.6 (ref 10–20)
CALCIUM SERPL-MCNC: 8.9 MG/DL (ref 8.5–10.5)
CHLORIDE SERPL-SCNC: 107 MMOL/L (ref 95–110)
CHOLEST SERPL-MCNC: 205 MG/DL (ref 110–200)
CO2 SERPL-SCNC: 28 MMOL/L (ref 22–32)
CREAT SERPL-MCNC: 1.02 MG/DL (ref 0.5–1.5)
EOSINOPHIL # BLD: 0.2 K/UL (ref 0–0.7)
EOSINOPHIL NFR BLD: 4 %
ERYTHROCYTE [DISTWIDTH] IN BLOOD BY AUTOMATED COUNT: 14 % (ref 11–15)
GLOBULIN PLAS-MCNC: 2.6 G/DL (ref 2.5–3.7)
GLUCOSE SERPL-MCNC: 100 MG/DL (ref 70–99)
HCT VFR BLD AUTO: 45 % (ref 41–52)
HDLC SERPL-MCNC: 58 MG/DL
HGB BLD-MCNC: 15.4 G/DL (ref 13.5–17.5)
LDLC SERPL CALC-MCNC: 138 MG/DL (ref 0–99)
LYMPHOCYTES # BLD: 2 K/UL (ref 1–4)
LYMPHOCYTES NFR BLD: 40 %
MCH RBC QN AUTO: 31.6 PG (ref 27–32)
MCHC RBC AUTO-ENTMCNC: 34.2 G/DL (ref 32–37)
MCV RBC AUTO: 92.2 FL (ref 80–100)
MONOCYTES # BLD: 0.5 K/UL (ref 0–1)
MONOCYTES NFR BLD: 10 %
NEUTROPHILS # BLD AUTO: 2.3 K/UL (ref 1.8–7.7)
NEUTROPHILS NFR BLD: 46 %
NONHDLC SERPL-MCNC: 147 MG/DL
OSMOLALITY UR CALC.SUM OF ELEC: 293 MOSM/KG (ref 275–295)
PATIENT FASTING: YES
PLATELET # BLD AUTO: 222 K/UL (ref 140–400)
PMV BLD AUTO: 9.5 FL (ref 7.4–10.3)
POTASSIUM SERPL-SCNC: 5.1 MMOL/L (ref 3.3–5.1)
PROT SERPL-MCNC: 6.7 G/DL (ref 5.9–8.4)
RBC # BLD AUTO: 4.88 M/UL (ref 4.5–5.9)
SODIUM SERPL-SCNC: 140 MMOL/L (ref 136–144)
TRIGL SERPL-MCNC: 45 MG/DL (ref 1–149)
TSH SERPL-ACNC: 2.15 UIU/ML (ref 0.45–5.33)
WBC # BLD AUTO: 5 K/UL (ref 4–11)

## 2018-07-12 PROCEDURE — 80053 COMPREHEN METABOLIC PANEL: CPT

## 2018-07-12 PROCEDURE — 80061 LIPID PANEL: CPT

## 2018-07-12 PROCEDURE — 85025 COMPLETE CBC W/AUTO DIFF WBC: CPT

## 2018-07-12 PROCEDURE — 36415 COLL VENOUS BLD VENIPUNCTURE: CPT

## 2018-07-12 PROCEDURE — 84443 ASSAY THYROID STIM HORMONE: CPT

## 2018-07-16 ENCOUNTER — TELEPHONE (OUTPATIENT)
Dept: INTERNAL MEDICINE CLINIC | Facility: CLINIC | Age: 68
End: 2018-07-16

## 2018-07-16 NOTE — TELEPHONE ENCOUNTER
Hep B vaccine ordered; will do vaccine #1 now, vaccine #2 in one month, and vaccine #3 six months after vaccine #1; please call pt at Cell [phone]    -----------------------   ----- Message from Kevin Avina to Kalen Youngblood MD sent at 7/16/2

## 2018-07-16 NOTE — TELEPHONE ENCOUNTER
Called and left a vm relaying MDs message. Instructed to call our office to schedule a nurse visit for Hep B vaccine.

## 2018-09-07 ENCOUNTER — NURSE ONLY (OUTPATIENT)
Dept: INTERNAL MEDICINE CLINIC | Facility: CLINIC | Age: 68
End: 2018-09-07
Payer: MEDICARE

## 2018-09-07 DIAGNOSIS — Z23 NEED FOR HEPATITIS B VACCINATION: Primary | ICD-10-CM

## 2018-09-07 PROCEDURE — G0010 ADMIN HEPATITIS B VACCINE: HCPCS | Performed by: INTERNAL MEDICINE

## 2018-09-07 PROCEDURE — 90746 HEPB VACCINE 3 DOSE ADULT IM: CPT | Performed by: INTERNAL MEDICINE

## 2018-12-04 RX ORDER — ALBUTEROL SULFATE 90 UG/1
2 AEROSOL, METERED RESPIRATORY (INHALATION) EVERY 6 HOURS PRN
Qty: 1 INHALER | Refills: 5 | Status: SHIPPED | OUTPATIENT
Start: 2018-12-04 | End: 2021-04-22

## 2019-01-24 ENCOUNTER — NURSE ONLY (OUTPATIENT)
Dept: INTERNAL MEDICINE CLINIC | Facility: CLINIC | Age: 69
End: 2019-01-24
Payer: MEDICARE

## 2019-01-24 DIAGNOSIS — Z23 NEED FOR HEPATITIS B VACCINATION: Primary | ICD-10-CM

## 2019-01-24 PROCEDURE — 90746 HEPB VACCINE 3 DOSE ADULT IM: CPT | Performed by: INTERNAL MEDICINE

## 2019-01-24 PROCEDURE — G0010 ADMIN HEPATITIS B VACCINE: HCPCS | Performed by: INTERNAL MEDICINE

## 2019-04-01 ENCOUNTER — PATIENT MESSAGE (OUTPATIENT)
Dept: INTERNAL MEDICINE CLINIC | Facility: CLINIC | Age: 69
End: 2019-04-01

## 2019-04-01 DIAGNOSIS — R53.83 OTHER FATIGUE: Primary | ICD-10-CM

## 2019-04-01 DIAGNOSIS — E78.00 HYPERCHOLESTEREMIA: ICD-10-CM

## 2019-04-01 NOTE — TELEPHONE ENCOUNTER
From: Guero Sandhu  To: Malreni Mishra MD  Sent: 4/1/2019 4:33 PM CDT  Subject: Non-Urgent Medical Question    Hi Doc    The chart does not show my next apptmt. Hmmmmmm. I'll call about that.     1 - I keep getting phone calls about a \"follow up vis

## 2019-06-13 PROBLEM — M77.12 LATERAL EPICONDYLITIS OF LEFT ELBOW: Status: ACTIVE | Noted: 2019-06-13

## 2019-06-17 PROBLEM — M25.522 LEFT ELBOW PAIN: Status: ACTIVE | Noted: 2019-06-17

## 2019-06-21 ENCOUNTER — HOSPITAL ENCOUNTER (OUTPATIENT)
Dept: MRI IMAGING | Facility: HOSPITAL | Age: 69
Discharge: HOME | End: 2019-06-21
Attending: ORTHOPAEDIC SURGERY
Payer: MEDICARE

## 2019-06-21 ENCOUNTER — HOSPITAL ENCOUNTER (OUTPATIENT)
Dept: GENERAL RADIOLOGY | Facility: HOSPITAL | Age: 69
Discharge: HOME | End: 2019-06-21
Attending: RADIOLOGY
Payer: MEDICARE

## 2019-06-21 DIAGNOSIS — M77.12 LATERAL EPICONDYLITIS OF LEFT ELBOW: ICD-10-CM

## 2019-06-21 DIAGNOSIS — R52 PAIN: ICD-10-CM

## 2019-06-21 DIAGNOSIS — M25.522 LEFT ELBOW PAIN: ICD-10-CM

## 2019-06-21 PROCEDURE — 73080 X-RAY EXAM OF ELBOW: CPT | Performed by: RADIOLOGY

## 2019-06-21 PROCEDURE — 73221 MRI JOINT UPR EXTREM W/O DYE: CPT | Performed by: ORTHOPAEDIC SURGERY

## 2019-08-22 ENCOUNTER — LAB ENCOUNTER (OUTPATIENT)
Dept: LAB | Age: 69
End: 2019-08-22
Attending: INTERNAL MEDICINE
Payer: MEDICARE

## 2019-08-22 ENCOUNTER — OFFICE VISIT (OUTPATIENT)
Dept: INTERNAL MEDICINE CLINIC | Facility: CLINIC | Age: 69
End: 2019-08-22
Payer: MEDICARE

## 2019-08-22 VITALS
TEMPERATURE: 98 F | HEART RATE: 56 BPM | DIASTOLIC BLOOD PRESSURE: 82 MMHG | SYSTOLIC BLOOD PRESSURE: 118 MMHG | BODY MASS INDEX: 30.66 KG/M2 | WEIGHT: 219 LBS | HEIGHT: 70.75 IN | OXYGEN SATURATION: 99 %

## 2019-08-22 DIAGNOSIS — J45.20 MILD INTERMITTENT ASTHMA WITHOUT COMPLICATION: ICD-10-CM

## 2019-08-22 DIAGNOSIS — Z00.00 PHYSICAL EXAM, ANNUAL: Primary | ICD-10-CM

## 2019-08-22 DIAGNOSIS — M77.12 LATERAL EPICONDYLITIS OF LEFT ELBOW: ICD-10-CM

## 2019-08-22 DIAGNOSIS — Z82.49 FAMILY HISTORY OF CORONARY ARTERY DISEASE: ICD-10-CM

## 2019-08-22 DIAGNOSIS — R53.83 OTHER FATIGUE: ICD-10-CM

## 2019-08-22 DIAGNOSIS — K63.5 POLYP OF COLON, UNSPECIFIED PART OF COLON, UNSPECIFIED TYPE: ICD-10-CM

## 2019-08-22 DIAGNOSIS — E78.00 HYPERCHOLESTEREMIA: ICD-10-CM

## 2019-08-22 DIAGNOSIS — Z12.5 SCREENING PSA (PROSTATE SPECIFIC ANTIGEN): ICD-10-CM

## 2019-08-22 DIAGNOSIS — D22.9 NEVUS: ICD-10-CM

## 2019-08-22 DIAGNOSIS — L24.7 IRRITANT CONTACT DERMATITIS DUE TO PLANTS, EXCEPT FOOD: ICD-10-CM

## 2019-08-22 DIAGNOSIS — I70.0 AORTIC ATHEROSCLEROSIS (HCC): ICD-10-CM

## 2019-08-22 DIAGNOSIS — Z86.711 HISTORY OF PULMONARY EMBOLUS (PE): ICD-10-CM

## 2019-08-22 LAB
ALBUMIN SERPL-MCNC: 3.6 G/DL (ref 3.4–5)
ALBUMIN/GLOB SERPL: 1.1 {RATIO} (ref 1–2)
ALP LIVER SERPL-CCNC: 54 U/L (ref 45–117)
ALT SERPL-CCNC: 29 U/L (ref 16–61)
ANION GAP SERPL CALC-SCNC: 6 MMOL/L (ref 0–18)
AST SERPL-CCNC: 24 U/L (ref 15–37)
BASOPHILS # BLD AUTO: 0.04 X10(3) UL (ref 0–0.2)
BASOPHILS NFR BLD AUTO: 0.7 %
BILIRUB SERPL-MCNC: 0.7 MG/DL (ref 0.1–2)
BUN BLD-MCNC: 23 MG/DL (ref 7–18)
BUN/CREAT SERPL: 19.7 (ref 10–20)
CALCIUM BLD-MCNC: 8.4 MG/DL (ref 8.5–10.1)
CHLORIDE SERPL-SCNC: 109 MMOL/L (ref 98–112)
CHOLEST SMN-MCNC: 186 MG/DL (ref ?–200)
CO2 SERPL-SCNC: 27 MMOL/L (ref 21–32)
CREAT BLD-MCNC: 1.17 MG/DL (ref 0.7–1.3)
DEPRECATED RDW RBC AUTO: 46.8 FL (ref 35.1–46.3)
EOSINOPHIL # BLD AUTO: 0.31 X10(3) UL (ref 0–0.7)
EOSINOPHIL NFR BLD AUTO: 5.4 %
ERYTHROCYTE [DISTWIDTH] IN BLOOD BY AUTOMATED COUNT: 13.7 % (ref 11–15)
GLOBULIN PLAS-MCNC: 3.3 G/DL (ref 2.8–4.4)
GLUCOSE BLD-MCNC: 101 MG/DL (ref 70–99)
HCT VFR BLD AUTO: 46 % (ref 39–53)
HDLC SERPL-MCNC: 64 MG/DL (ref 40–59)
HGB BLD-MCNC: 15.6 G/DL (ref 13–17.5)
IMM GRANULOCYTES # BLD AUTO: 0.04 X10(3) UL (ref 0–1)
IMM GRANULOCYTES NFR BLD: 0.7 %
LDLC SERPL CALC-MCNC: 112 MG/DL (ref ?–100)
LYMPHOCYTES # BLD AUTO: 2.17 X10(3) UL (ref 1–4)
LYMPHOCYTES NFR BLD AUTO: 37.7 %
M PROTEIN MFR SERPL ELPH: 6.9 G/DL (ref 6.4–8.2)
MCH RBC QN AUTO: 31.6 PG (ref 26–34)
MCHC RBC AUTO-ENTMCNC: 33.9 G/DL (ref 31–37)
MCV RBC AUTO: 93.3 FL (ref 80–100)
MONOCYTES # BLD AUTO: 0.73 X10(3) UL (ref 0.1–1)
MONOCYTES NFR BLD AUTO: 12.7 %
NEUTROPHILS # BLD AUTO: 2.47 X10 (3) UL (ref 1.5–7.7)
NEUTROPHILS # BLD AUTO: 2.47 X10(3) UL (ref 1.5–7.7)
NEUTROPHILS NFR BLD AUTO: 42.8 %
NONHDLC SERPL-MCNC: 122 MG/DL (ref ?–130)
OSMOLALITY SERPL CALC.SUM OF ELEC: 298 MOSM/KG (ref 275–295)
PATIENT FASTING: YES
PATIENT FASTING: YES
PLATELET # BLD AUTO: 245 10(3)UL (ref 150–450)
POTASSIUM SERPL-SCNC: 5 MMOL/L (ref 3.5–5.1)
RBC # BLD AUTO: 4.93 X10(6)UL (ref 3.8–5.8)
SODIUM SERPL-SCNC: 142 MMOL/L (ref 136–145)
TRIGL SERPL-MCNC: 50 MG/DL (ref 30–149)
TSI SER-ACNC: 2.86 MIU/ML (ref 0.36–3.74)
VLDLC SERPL CALC-MCNC: 10 MG/DL (ref 0–30)
WBC # BLD AUTO: 5.8 X10(3) UL (ref 4–11)

## 2019-08-22 PROCEDURE — 80053 COMPREHEN METABOLIC PANEL: CPT

## 2019-08-22 PROCEDURE — 85025 COMPLETE CBC W/AUTO DIFF WBC: CPT

## 2019-08-22 PROCEDURE — 84443 ASSAY THYROID STIM HORMONE: CPT

## 2019-08-22 PROCEDURE — G0439 PPPS, SUBSEQ VISIT: HCPCS | Performed by: INTERNAL MEDICINE

## 2019-08-22 PROCEDURE — 36415 COLL VENOUS BLD VENIPUNCTURE: CPT

## 2019-08-22 PROCEDURE — G0009 ADMIN PNEUMOCOCCAL VACCINE: HCPCS | Performed by: INTERNAL MEDICINE

## 2019-08-22 PROCEDURE — 90670 PCV13 VACCINE IM: CPT | Performed by: INTERNAL MEDICINE

## 2019-08-22 PROCEDURE — 80061 LIPID PANEL: CPT

## 2019-08-22 NOTE — PROGRESS NOTES
Cornell Johnson is a 80year old male. HPI:   Patient presents with:   Annual  Rash: right forearm x 1.5 weeks now      81 y/o subsequent Medicare annual wellness exam; reports that he had contact with poison ivy to right forearm; has been using hydrocor Pipe        Quit date: 1995        Years since quittin.7      Smokeless tobacco: Never Used    Alcohol use: Yes      Comment: One drink daily    Drug use: No       Medications (Active prior to today's visit):    Current Outpatient Medications: Yes    Hearing Problems?: No     Functional Status     Hearing Problems?: No    Vision Problems? : No    Difficulty walking?: No    Difficulty dressing or bathing?: No    Problems with daily activities? : No    Memory Problems?: No      Fall/Risk Assessmen EKG One Time     Colorectal Cancer Screening      Colonoscopy Screen every 10 years Colonoscopy due on 05/03/2016 Update Health Maintenance if applicable    Flex Sigmoidoscopy Screen every 5 years No results found for this or any previous visit.  No for: DIGOXIN No flowsheet data found. Diabetes      HgbA1C  Annually No results found for: A1C No flowsheet data found.     Creat/alb ratio  Annually      LDL  Annually LDL Cholesterol Calc (mg/dL)   Date Value   12/15/2011 100 (H)     LDL Cholesterol (m normoactive bowel sounds, soft, non-tender and non-distended  Extremities: no clubbing, cyanosis or edema  Vascular: no carotid bruits; DP/PT 2/2  Musculoskeletal: Motor 5/5 upper and lower extremities  Neurological: cranial nerves II-XII intact; light fermin requested or ordered in this encounter       Imaging & Referrals:  PNEUMOCOCCAL VACC, 13 LAI IM     8/22/2019  Lefty Luciano MD

## 2019-09-03 ENCOUNTER — TELEPHONE (OUTPATIENT)
Dept: INTERNAL MEDICINE CLINIC | Facility: CLINIC | Age: 69
End: 2019-09-03

## 2019-09-03 NOTE — TELEPHONE ENCOUNTER
Labs all OK on 8/22; please call pt    --------------------------------------  Component      Latest Ref Rng & Units 8/22/2019 8/22/2019           9:11 AM  9:11 AM   WBC      4.0 - 11.0 x10(3) uL  5.8   RBC      3.80 - 5.80 x10(6)uL  4.93   Hemoglobin Patient Fasting?        Yes Yes   Cholesterol, Total      <200 mg/dL 186    HDL Cholesterol      40 - 59 mg/dL 64 (H)    Triglycerides      30 - 149 mg/dL 50    LDL Cholesterol Calc      <100 mg/dL 112 (H)    VLDL      0 - 30 mg/dL 10    NON HDL CHOL

## 2019-10-16 ENCOUNTER — TELEPHONE (OUTPATIENT)
Dept: INTERNAL MEDICINE CLINIC | Facility: CLINIC | Age: 69
End: 2019-10-16

## 2019-10-16 ENCOUNTER — PATIENT MESSAGE (OUTPATIENT)
Dept: INTERNAL MEDICINE CLINIC | Facility: CLINIC | Age: 69
End: 2019-10-16

## 2019-10-16 NOTE — TELEPHONE ENCOUNTER
Genetic testing for hypertrophic cardiomyopathy is available as a clinical test. Genetic testing is limited by the fact that only 50 percent of patients have an identifiable mutation, and a substantial proportion have variants of uncertain significance (VU

## 2019-10-16 NOTE — TELEPHONE ENCOUNTER
I spoke with patient and relayed Dr. Shell Sorto message. Patient verbalized understanding. Patient says he is currently outside on a run. Patient declined referral to Dr. Sheila Barrientos. Invited patient to call back if he would like to review the message again.  Pa

## 2019-10-16 NOTE — TELEPHONE ENCOUNTER
From: Miah Castillo  To: Shahram Tamayo MD  Sent: 10/16/2019 9:30 AM CDT  Subject: Non-Urgent Medical Question    Hi Doc:  My Uncle was just told that he has Hypertrophic Cariomyopathy which I believe is a thickening of part of your heart muscle.  He w

## 2019-10-16 NOTE — TELEPHONE ENCOUNTER
----- Message from Claudia Hendrix sent at 10/16/2019  9:30 AM CDT -----  Regarding: Non-Urgent Medical Question  Contact: [phone]  Hi Doc:  My Uncle was just told that he has Hypertrophic Cariomyopathy which I believe is a thickening of part of your

## 2019-10-24 NOTE — TELEPHONE ENCOUNTER
Pt. Called stating he received the info from Dr. Kristy Reyes. But was not able to write anything down. He is asking if he can get a hard copy of the information or can he get it through My Chart? Pt. Can be reached at [phone].

## 2020-04-28 ENCOUNTER — TELEMEDICINE (OUTPATIENT)
Dept: INTERNAL MEDICINE CLINIC | Facility: CLINIC | Age: 70
End: 2020-04-28

## 2020-04-28 ENCOUNTER — E-VISIT (OUTPATIENT)
Dept: FAMILY MEDICINE CLINIC | Facility: CLINIC | Age: 70
End: 2020-04-28

## 2020-04-28 ENCOUNTER — TELEPHONE (OUTPATIENT)
Dept: INTERNAL MEDICINE CLINIC | Facility: CLINIC | Age: 70
End: 2020-04-28
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
## 2020-04-28 DIAGNOSIS — Z02.9 ADMINISTRATIVE ENCOUNTER: Primary | ICD-10-CM

## 2020-04-28 PROCEDURE — 99213 OFFICE O/P EST LOW 20 MIN: CPT | Performed by: INTERNAL MEDICINE

## 2020-04-28 RX ORDER — DOXYCYCLINE HYCLATE 100 MG/1
CAPSULE ORAL
Qty: 2 CAPSULE | Refills: 0 | Status: SHIPPED | OUTPATIENT
Start: 2020-04-28 | End: 2020-05-08 | Stop reason: ALTCHOICE

## 2020-04-28 NOTE — PROGRESS NOTES
Triston Garcia is a 77year old male who presents for     Video visit    Tick bite  Patient was in [place], [place] last weekend. Today (Tuesday), he saw a tick bite on his back (R flank area of back.)    Tick site total size is size of a nickel.    He go Tobacco Use      Smoking status: Former Smoker        Years: 20.00        Types: Pipe        Quit date: 1995        Years since quittin.3      Smokeless tobacco: Never Used    Alcohol use: Yes      Comment: One drink daily    Drug use:  No (ICAPS) Oral Cap Take 1 capsule by mouth daily.    30 capsule 0         Nika Price MD  4/28/2020

## 2020-04-28 NOTE — TELEPHONE ENCOUNTER
Pt called back  Downloading the [facility]  If on call would like to do a video call to see the bite

## 2020-04-28 NOTE — TELEPHONE ENCOUNTER
Patient was in woods and got a deer tick bite  He was able to remove the tick  What should pt be doing? Should he get tested for Lyme disease?     Requests call back today to advise [phone]

## 2020-04-28 NOTE — TELEPHONE ENCOUNTER
To Dr. Verenice Wolf - see below  Pt thinks it happened this weekend, pulled off complete tick this AM.  Pt reports fever/chills/rash.

## 2020-04-29 NOTE — TELEPHONE ENCOUNTER
Miriam Galvan, I did a video visit with pt yesterday 4/28/20 for tick bite. I told him I'd let you know I did this--as fyi. Thanks, Braden Calvillo.     (routed to Dr. Zachary Boone).

## 2020-05-06 ENCOUNTER — APPOINTMENT (OUTPATIENT)
Dept: GENERAL RADIOLOGY | Facility: HOSPITAL | Age: 70
End: 2020-05-06
Attending: EMERGENCY MEDICINE
Payer: MEDICARE

## 2020-05-06 ENCOUNTER — HOSPITAL ENCOUNTER (EMERGENCY)
Facility: HOSPITAL | Age: 70
Discharge: HOME | End: 2020-05-06
Attending: EMERGENCY MEDICINE
Payer: MEDICARE

## 2020-05-06 VITALS
HEART RATE: 41 BPM | WEIGHT: 199.38 LBS | SYSTOLIC BLOOD PRESSURE: 125 MMHG | BODY MASS INDEX: 28 KG/M2 | TEMPERATURE: 98 F | DIASTOLIC BLOOD PRESSURE: 81 MMHG | RESPIRATION RATE: 14 BRPM | OXYGEN SATURATION: 98 %

## 2020-05-06 DIAGNOSIS — R07.89 CHEST PAIN, ATYPICAL: Primary | ICD-10-CM

## 2020-05-06 PROCEDURE — 85379 FIBRIN DEGRADATION QUANT: CPT | Performed by: EMERGENCY MEDICINE

## 2020-05-06 PROCEDURE — 84484 ASSAY OF TROPONIN QUANT: CPT | Performed by: EMERGENCY MEDICINE

## 2020-05-06 PROCEDURE — 85025 COMPLETE CBC W/AUTO DIFF WBC: CPT | Performed by: EMERGENCY MEDICINE

## 2020-05-06 PROCEDURE — 80048 BASIC METABOLIC PNL TOTAL CA: CPT | Performed by: EMERGENCY MEDICINE

## 2020-05-06 PROCEDURE — 93010 ELECTROCARDIOGRAM REPORT: CPT | Performed by: EMERGENCY MEDICINE

## 2020-05-06 PROCEDURE — 71045 X-RAY EXAM CHEST 1 VIEW: CPT | Performed by: EMERGENCY MEDICINE

## 2020-05-06 PROCEDURE — 36415 COLL VENOUS BLD VENIPUNCTURE: CPT

## 2020-05-06 PROCEDURE — 93005 ELECTROCARDIOGRAM TRACING: CPT

## 2020-05-06 PROCEDURE — 99284 EMERGENCY DEPT VISIT MOD MDM: CPT

## 2020-05-06 NOTE — ED PROVIDER NOTES
Patient Seen in: [facility] AND [facility] Emergency Department    History   Patient presents with:  Chest Pain Angina    Stated Complaint: CP    HPI  67-year-old male with history of PE no longer on Xarelto, presenting for evaluation of chest pain.   He woke up daily with food. Albuterol Sulfate HFA (VENTOLIN HFA) 108 (90 Base) MCG/ACT Inhalation Aero Soln,  Inhale 2 puffs into the lungs every 6 (six) hours as needed for Wheezing.    Cholecalciferol (VITAMIN D3) 1000 units Oral Cap,  Take 1 tablet by mouth daily lesions,  Neck: normal range of motion, no tenderness, supple. Eyes: PERRL, EOMI, conjunctiva normal, no discharge. Sclera anicteric.   Cardiovascular: Regular rate and rhythm, normal S1-S2, no extra heart sounds or murmurs, no peripheral edema  Respirator EKG    Rate, intervals and axes as noted on EKG Report.   Rate: 48  Rhythm: Sinus Rhythm  Reading: No STEMI, first-degree heart block, normal axis, no ectopy, bradycardia is stable compared to prior EKG from 2015    Repeat EKG at 1943: sinus rhythm, rat persistent chest pain, dyspnea, syncope or near syncope and he is agreeable to the plan.     Heart Score:    HEART Score      Title    Criteria Score   Age:  72 and older Age Score:  2   History:  Slightly Suspicious Hx Score:  0   EKG:  Normal EKG Score:

## 2020-05-07 ENCOUNTER — TELEPHONE (OUTPATIENT)
Dept: INTERNAL MEDICINE CLINIC | Facility: CLINIC | Age: 70
End: 2020-05-07

## 2020-05-07 NOTE — TELEPHONE ENCOUNTER
Please call pt  He was in ER yesterday and was told to follow up with Dr Gabi Buckner today  Pt still has pain, pt was told to call Dr Gabi Buckner right away today  Tasked to nursing

## 2020-05-08 ENCOUNTER — OFFICE VISIT (OUTPATIENT)
Dept: INTERNAL MEDICINE CLINIC | Facility: CLINIC | Age: 70
End: 2020-05-08
Payer: MEDICARE

## 2020-05-08 VITALS
BODY MASS INDEX: 29.26 KG/M2 | OXYGEN SATURATION: 99 % | SYSTOLIC BLOOD PRESSURE: 140 MMHG | DIASTOLIC BLOOD PRESSURE: 72 MMHG | WEIGHT: 209 LBS | HEIGHT: 70.75 IN | HEART RATE: 39 BPM | TEMPERATURE: 98 F

## 2020-05-08 DIAGNOSIS — Z86.711 HISTORY OF PULMONARY EMBOLUS (PE): ICD-10-CM

## 2020-05-08 DIAGNOSIS — R07.2 PRECORDIAL PAIN: Primary | ICD-10-CM

## 2020-05-08 DIAGNOSIS — J45.20 MILD INTERMITTENT ASTHMA WITHOUT COMPLICATION: ICD-10-CM

## 2020-05-08 PROCEDURE — G0463 HOSPITAL OUTPT CLINIC VISIT: HCPCS | Performed by: INTERNAL MEDICINE

## 2020-05-08 PROCEDURE — 99214 OFFICE O/P EST MOD 30 MIN: CPT | Performed by: INTERNAL MEDICINE

## 2020-05-08 NOTE — PROGRESS NOTES
Henna Guajardo is a 77year old male. HPI:   Patient presents with:  ER F/U: [facility] ER 5/6 chest pain. Reports continued consistent RT sided chest pain. No SOB.        78 y/o M with 4 d h/o right sided chest pain; pain worse when sitting upright; pain reso History: Social History    Tobacco Use      Smoking status: Former Smoker        Years: 20.00        Types: Pipe        Quit date: 1995        Years since quittin.4      Smokeless tobacco: Never Used    Alcohol use: Yes      Comment: One drink d ASSESSMENT/PLAN:   Right sided chest pain  Suspect musculoskeletal etiology; advise ibuprofen 400 mg po TID prn; if sxs persist, would consider XR right ribs and T-spine    Health Maintenance  Prevnar 13 now; plan Pneumovax 23 in one year     Contact sachin

## 2020-08-27 ENCOUNTER — LAB ENCOUNTER (OUTPATIENT)
Dept: LAB | Age: 70
End: 2020-08-27
Attending: INTERNAL MEDICINE
Payer: MEDICARE

## 2020-08-27 ENCOUNTER — OFFICE VISIT (OUTPATIENT)
Dept: INTERNAL MEDICINE CLINIC | Facility: CLINIC | Age: 70
End: 2020-08-27
Payer: MEDICARE

## 2020-08-27 VITALS
WEIGHT: 209.63 LBS | HEIGHT: 70.75 IN | TEMPERATURE: 98 F | OXYGEN SATURATION: 98 % | BODY MASS INDEX: 29.35 KG/M2 | SYSTOLIC BLOOD PRESSURE: 130 MMHG | HEART RATE: 68 BPM | DIASTOLIC BLOOD PRESSURE: 74 MMHG

## 2020-08-27 DIAGNOSIS — E78.00 HYPERCHOLESTEREMIA: ICD-10-CM

## 2020-08-27 DIAGNOSIS — J45.20 MILD INTERMITTENT ASTHMA WITHOUT COMPLICATION: ICD-10-CM

## 2020-08-27 DIAGNOSIS — Z00.00 PHYSICAL EXAM, ANNUAL: ICD-10-CM

## 2020-08-27 DIAGNOSIS — Z12.5 SCREENING PSA (PROSTATE SPECIFIC ANTIGEN): ICD-10-CM

## 2020-08-27 DIAGNOSIS — D22.9 NEVUS: ICD-10-CM

## 2020-08-27 DIAGNOSIS — Z00.00 PHYSICAL EXAM, ANNUAL: Primary | ICD-10-CM

## 2020-08-27 DIAGNOSIS — K63.5 POLYP OF COLON, UNSPECIFIED PART OF COLON, UNSPECIFIED TYPE: ICD-10-CM

## 2020-08-27 DIAGNOSIS — Z82.49 FAMILY HISTORY OF CORONARY ARTERY DISEASE: ICD-10-CM

## 2020-08-27 DIAGNOSIS — Z86.711 HISTORY OF PULMONARY EMBOLUS (PE): ICD-10-CM

## 2020-08-27 LAB
CHOLEST SMN-MCNC: 197 MG/DL (ref ?–200)
HDLC SERPL-MCNC: 69 MG/DL (ref 40–59)
LDLC SERPL CALC-MCNC: 120 MG/DL (ref ?–100)
NONHDLC SERPL-MCNC: 128 MG/DL (ref ?–130)
PATIENT FASTING Y/N/NP: YES
TRIGL SERPL-MCNC: 41 MG/DL (ref 30–149)
TSI SER-ACNC: 1.83 MIU/ML (ref 0.36–3.74)
VLDLC SERPL CALC-MCNC: 8 MG/DL (ref 0–30)

## 2020-08-27 PROCEDURE — G0439 PPPS, SUBSEQ VISIT: HCPCS | Performed by: INTERNAL MEDICINE

## 2020-08-27 PROCEDURE — 90732 PPSV23 VACC 2 YRS+ SUBQ/IM: CPT | Performed by: INTERNAL MEDICINE

## 2020-08-27 PROCEDURE — G0009 ADMIN PNEUMOCOCCAL VACCINE: HCPCS | Performed by: INTERNAL MEDICINE

## 2020-08-27 PROCEDURE — 84443 ASSAY THYROID STIM HORMONE: CPT

## 2020-08-27 PROCEDURE — 80061 LIPID PANEL: CPT

## 2020-08-27 PROCEDURE — 36415 COLL VENOUS BLD VENIPUNCTURE: CPT

## 2020-08-27 NOTE — PROGRESS NOTES
Kody Lacey is a 77year old male.     HPI:   Patient presents with:  Physical      76 y/o M here for subsequent Medicare annual wellness exam;  no CP; no SOB; no headaches; no palpitation; no constipation; no diarrhea; no melena; no hematochezia Alcohol use: Yes      Comment: One drink daily    Drug use: No       Medications (Active prior to today's visit):  Current Outpatient Medications   Medication Sig Dispense Refill   • IBUPROFEN OR 2 tablets nightly     • Albuterol Sulfate HFA (VENTOLIN HFA) Assessment          Have you fallen in the last 12 months?: 0-No                                        Fall/Risk Scorin          Depression Screening (PHQ-2/PHQ-9): Over the LAST 2 WEEKS   Little interest or pleasure in doing things (over the last two data found.     Glaucoma Screening      Ophthalmology Visit Annually     Prostate Cancer Screening      PSA  Annually PSA due on 04/17/2016  Update Health Maintenance if applicable   Immunizations      Influenza Orders placed or performed in visit on 12/13/ No flowsheet data found. Dilated Eye exam  Annually No flowsheet data found. No flowsheet data found. COPD      Spirometry Testing Annually No results found for this or any previous visit. No flowsheet data found.             ROS:   Constitutional: Physical exam  Prevnar 13 give m in 2019; plan Pneumovax 23 today     Asthma   on Ventolin HFA 2 p QID prn; uses rarely; consider inhaled steroid in future; remote smoking (quit 1995, previous pipe smoker x 20 years)     History of Pulmonary embolus ([address].

## 2021-01-07 ENCOUNTER — TELEPHONE (OUTPATIENT)
Dept: INTERNAL MEDICINE CLINIC | Facility: CLINIC | Age: 71
End: 2021-01-07

## 2021-01-08 NOTE — TELEPHONE ENCOUNTER
Please refer to urologist Dr Canelo Pa     [address] [phone]    Please call pt

## 2021-01-08 NOTE — TELEPHONE ENCOUNTER
To Dr. Adrian Teran to please recommend Urologist for patient. [facility] staff will send patient the following message regarding COVID vaccination once MD has responded to this message:    [address] Moore to please respond to patient's second part of the question regarding COVID vaccinations after MD provides Urology recommendation-------  \"Luis,  COVID-19 Vaccine Update 1/7/2021    On Dec. 11, a COVID-19 vaccine by [employer] received FDA emergency use authorization in the [place] for ages 12 and up. Other vaccines are also in development. At [place] [address], your safety and well-being is our top priority. We are following guidance from the government, CDC and state and local health departments on a safe, fair and equitable distribution of the COVID-19 vaccine. The COVID-19 vaccine is currently in limited supply so those at highest risk will receive it first, starting with frontline healthcare workers. We do not have scheduling or wait lists for patients at this time due to the early stages of vaccine approval and availability. Supplies will increase over time and it's estimated that the general public should be able to start receiving the vaccine in the spring/summer of 2021. In the meantime, we are committed to providing you with up-to-date vaccine information to help you in your decision-making process. Check here for the latest on the COVID-19 vaccine. \"

## 2021-01-08 NOTE — TELEPHONE ENCOUNTER
----- Message from Allan Tylor sent at 1/7/2021  2:17 PM CST -----  Regarding: Referral Request  Contact: [phone]  Hi Doc! I pray that your new year is a great one. I was notified by Urologist, Dr. Yarelis Summers, that he is retiring from his practice. Could you please refer me to a new Urologist?  I'd really appreciate it. Also, do you know when I might receive the COVID vaccine? Thanks for all you do for me.     [employer]

## 2021-03-12 DIAGNOSIS — Z23 NEED FOR VACCINATION: ICD-10-CM

## 2021-04-22 ENCOUNTER — LAB ENCOUNTER (OUTPATIENT)
Dept: LAB | Facility: HOSPITAL | Age: 71
End: 2021-04-22
Attending: UROLOGY
Payer: MEDICARE

## 2021-04-22 ENCOUNTER — OFFICE VISIT (OUTPATIENT)
Dept: SURGERY | Facility: CLINIC | Age: 71
End: 2021-04-22
Payer: MEDICARE

## 2021-04-22 VITALS
HEART RATE: 50 BPM | HEIGHT: 71 IN | DIASTOLIC BLOOD PRESSURE: 82 MMHG | SYSTOLIC BLOOD PRESSURE: 160 MMHG | WEIGHT: 210 LBS | BODY MASS INDEX: 29.4 KG/M2

## 2021-04-22 DIAGNOSIS — N13.8 BPH WITH OBSTRUCTION/LOWER URINARY TRACT SYMPTOMS: ICD-10-CM

## 2021-04-22 DIAGNOSIS — Z80.42 FAMILY HISTORY OF PROSTATE CANCER: ICD-10-CM

## 2021-04-22 DIAGNOSIS — Z12.5 PROSTATE CANCER SCREENING: ICD-10-CM

## 2021-04-22 DIAGNOSIS — N40.1 BPH WITH OBSTRUCTION/LOWER URINARY TRACT SYMPTOMS: ICD-10-CM

## 2021-04-22 DIAGNOSIS — Z12.5 PROSTATE CANCER SCREENING: Primary | ICD-10-CM

## 2021-04-22 PROCEDURE — 99204 OFFICE O/P NEW MOD 45 MIN: CPT | Performed by: UROLOGY

## 2021-04-22 PROCEDURE — 81003 URINALYSIS AUTO W/O SCOPE: CPT | Performed by: UROLOGY

## 2021-04-22 PROCEDURE — 36415 COLL VENOUS BLD VENIPUNCTURE: CPT

## 2021-04-22 NOTE — PROGRESS NOTES
[facility], [facility] Urology  Initial Office Consultation    HPI:   Moy Dominguez is a 72year old male here today for consultation at the request of, and a copy of this note will be sent to, Ailyn Farooq MD.    1. Family history of prostate cancer  2.  B Allergies: Patient has no known allergies. REVIEW OF SYSTEMS:  Pertinent positives and negatives per HPI. A 10-point ROS was performed and is otherwise negative.        EXAM:  /82 (BP Location: Right arm, Patient Position: Sitting, Cuff Size: physiology. Discussed management of BPH including watchful waiting, medical therapy, and radical treatment options. Recommend to continue watchful waiting of mild LUTS without any bother.     Discussed prostate cancer screening per AUA guidelines includ

## 2021-09-02 ENCOUNTER — OFFICE VISIT (OUTPATIENT)
Dept: INTERNAL MEDICINE CLINIC | Facility: CLINIC | Age: 71
End: 2021-09-02
Payer: MEDICARE

## 2021-09-02 VITALS
BODY MASS INDEX: 30.07 KG/M2 | DIASTOLIC BLOOD PRESSURE: 68 MMHG | HEART RATE: 51 BPM | OXYGEN SATURATION: 99 % | WEIGHT: 214.81 LBS | SYSTOLIC BLOOD PRESSURE: 122 MMHG | RESPIRATION RATE: 12 BRPM | TEMPERATURE: 98 F | HEIGHT: 70.8 IN

## 2021-09-02 DIAGNOSIS — Z86.711 HISTORY OF PULMONARY EMBOLUS (PE): ICD-10-CM

## 2021-09-02 DIAGNOSIS — R53.83 OTHER FATIGUE: ICD-10-CM

## 2021-09-02 DIAGNOSIS — Z12.5 SCREENING PSA (PROSTATE SPECIFIC ANTIGEN): ICD-10-CM

## 2021-09-02 DIAGNOSIS — Z00.00 PHYSICAL EXAM, ANNUAL: Primary | ICD-10-CM

## 2021-09-02 DIAGNOSIS — E78.00 HYPERCHOLESTEREMIA: ICD-10-CM

## 2021-09-02 PROCEDURE — G0439 PPPS, SUBSEQ VISIT: HCPCS | Performed by: INTERNAL MEDICINE

## 2021-09-02 NOTE — PROGRESS NOTES
Karen Alexander is a 72year old male.     HPI:   Patient presents with:  Physical      71 y/o M here for subsequent Medicare annual wellness exam;  no CP; no SOB; no headaches; no palpitation; no constipation; no diarrhea; no melena; no hematochezia; has h Smoking status: Former Smoker        Years: 20.00        Types: Pipe        Quit date: 1995        Years since quittin.7      Smokeless tobacco: Never Used    Vaping Use      Vaping Use: Never used    Alcohol use: Yes      Comment: One drink Hearing Assessment (Required for AWV/SWV)      Hearing Screening    Time taken: 9/2/2021  8:08 AM  Screening Method: Whisper Test  Whisper Test Result: Pass         Visual Acuity                   Cognitive Assessment     What day of the week is this?: performed in visit on 08/27/20   • PNEUMOCOCCAL IMM (PNEUMOVAX)   Orders placed or performed in visit on 08/22/19   • PNEUMOCOCCAL VACC, 13 LAI IM    Update Immunization Activity if applicable    Hepatitis B Orders placed or performed in visit on 01/24/19 for ear drainage, hearing loss and nasal drainage  Eyes:  Negative for eye discharge and vision loss  Cardiovascular:  Negative for chest pain; negative palpitations  Respiratory:  Negative for cough, dyspnea and wheezing  Endocrine:  Negative for abnormal 20 years)     History of Pulmonary embolus (HCC)  Dx 4/9/15 with multiple scattered bilateral  PEs; venous Doppler neg for DVT; PE suspected to be exacerbated by immobility at work; completed Xarelto x 6 mos; w/u for lupus anticoagulant and ATIII negative;

## 2021-10-05 ENCOUNTER — LAB ENCOUNTER (OUTPATIENT)
Dept: LAB | Facility: HOSPITAL | Age: 71
End: 2021-10-05
Attending: INTERNAL MEDICINE
Payer: MEDICARE

## 2021-10-05 DIAGNOSIS — E78.00 HYPERCHOLESTEREMIA: ICD-10-CM

## 2021-10-05 DIAGNOSIS — R53.83 OTHER FATIGUE: ICD-10-CM

## 2021-10-05 PROCEDURE — 85025 COMPLETE CBC W/AUTO DIFF WBC: CPT

## 2021-10-05 PROCEDURE — 84443 ASSAY THYROID STIM HORMONE: CPT

## 2021-10-05 PROCEDURE — 80061 LIPID PANEL: CPT

## 2021-10-05 PROCEDURE — 36415 COLL VENOUS BLD VENIPUNCTURE: CPT

## 2021-10-05 PROCEDURE — 80053 COMPREHEN METABOLIC PANEL: CPT

## 2022-04-28 ENCOUNTER — OFFICE VISIT (OUTPATIENT)
Dept: SURGERY | Facility: CLINIC | Age: 72
End: 2022-04-28
Payer: MEDICARE

## 2022-04-28 ENCOUNTER — LAB ENCOUNTER (OUTPATIENT)
Dept: LAB | Facility: HOSPITAL | Age: 72
End: 2022-04-28
Attending: UROLOGY
Payer: MEDICARE

## 2022-04-28 DIAGNOSIS — N40.1 BPH WITH OBSTRUCTION/LOWER URINARY TRACT SYMPTOMS: ICD-10-CM

## 2022-04-28 DIAGNOSIS — Z80.42 FAMILY HISTORY OF PROSTATE CANCER: ICD-10-CM

## 2022-04-28 DIAGNOSIS — N13.8 BPH WITH OBSTRUCTION/LOWER URINARY TRACT SYMPTOMS: ICD-10-CM

## 2022-04-28 DIAGNOSIS — Z12.5 PROSTATE CANCER SCREENING: ICD-10-CM

## 2022-04-28 DIAGNOSIS — Z12.5 PROSTATE CANCER SCREENING: Primary | ICD-10-CM

## 2022-04-28 LAB — COMPLEXED PSA SERPL-MCNC: 0.91 NG/ML (ref ?–4)

## 2022-04-28 PROCEDURE — 99213 OFFICE O/P EST LOW 20 MIN: CPT | Performed by: UROLOGY

## 2022-04-28 PROCEDURE — 36415 COLL VENOUS BLD VENIPUNCTURE: CPT

## 2022-08-23 NOTE — LETTER
24      Patient: Luis Chapa  : 1950 Visit date: 2024    Dear Sylvain,      I examined your patient in consultation today.    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  We will plan on wide excision under frozen section.    Thank you for your kind referral. If I may answer any questions, please feel free to contact me.     Sincerely,   Handy Miranda MD     CC: Bruno Sinclair MD  
No heavy lifting/straining
Do not drive or operate machinery/No heavy lifting/straining
Adequate: hears normal conversation without difficulty

## 2022-09-01 ENCOUNTER — LAB ENCOUNTER (OUTPATIENT)
Dept: LAB | Age: 72
End: 2022-09-01
Attending: INTERNAL MEDICINE
Payer: MEDICARE

## 2022-09-01 ENCOUNTER — OFFICE VISIT (OUTPATIENT)
Dept: INTERNAL MEDICINE CLINIC | Facility: CLINIC | Age: 72
End: 2022-09-01
Payer: MEDICARE

## 2022-09-01 VITALS
SYSTOLIC BLOOD PRESSURE: 128 MMHG | TEMPERATURE: 98 F | DIASTOLIC BLOOD PRESSURE: 80 MMHG | OXYGEN SATURATION: 98 % | HEIGHT: 69.9 IN | WEIGHT: 222.38 LBS | BODY MASS INDEX: 31.84 KG/M2 | HEART RATE: 68 BPM

## 2022-09-01 DIAGNOSIS — Z12.5 SCREENING PSA (PROSTATE SPECIFIC ANTIGEN): ICD-10-CM

## 2022-09-01 DIAGNOSIS — R53.83 OTHER FATIGUE: ICD-10-CM

## 2022-09-01 DIAGNOSIS — J45.909 UNCOMPLICATED ASTHMA, UNSPECIFIED ASTHMA SEVERITY, UNSPECIFIED WHETHER PERSISTENT: ICD-10-CM

## 2022-09-01 DIAGNOSIS — E78.00 HYPERCHOLESTEREMIA: ICD-10-CM

## 2022-09-01 DIAGNOSIS — K63.5 POLYP OF COLON, UNSPECIFIED PART OF COLON, UNSPECIFIED TYPE: ICD-10-CM

## 2022-09-01 DIAGNOSIS — H91.90 HEARING LOSS, UNSPECIFIED HEARING LOSS TYPE, UNSPECIFIED LATERALITY: ICD-10-CM

## 2022-09-01 DIAGNOSIS — Z86.711 HISTORY OF PULMONARY EMBOLUS (PE): ICD-10-CM

## 2022-09-01 DIAGNOSIS — Z00.00 PHYSICAL EXAM, ANNUAL: Primary | ICD-10-CM

## 2022-09-01 LAB
ALBUMIN SERPL-MCNC: 3.6 G/DL (ref 3.4–5)
ALBUMIN/GLOB SERPL: 1.2 {RATIO} (ref 1–2)
ALP LIVER SERPL-CCNC: 47 U/L
ALT SERPL-CCNC: 28 U/L
ANION GAP SERPL CALC-SCNC: 4 MMOL/L (ref 0–18)
AST SERPL-CCNC: 18 U/L (ref 15–37)
BASOPHILS # BLD AUTO: 0.03 X10(3) UL (ref 0–0.2)
BASOPHILS NFR BLD AUTO: 0.6 %
BILIRUB SERPL-MCNC: 0.9 MG/DL (ref 0.1–2)
BUN BLD-MCNC: 14 MG/DL (ref 7–18)
BUN/CREAT SERPL: 13.6 (ref 10–20)
CALCIUM BLD-MCNC: 8.7 MG/DL (ref 8.5–10.1)
CHLORIDE SERPL-SCNC: 110 MMOL/L (ref 98–112)
CHOLEST SERPL-MCNC: 180 MG/DL (ref ?–200)
CO2 SERPL-SCNC: 29 MMOL/L (ref 21–32)
CREAT BLD-MCNC: 1.03 MG/DL
DEPRECATED RDW RBC AUTO: 48.3 FL (ref 35.1–46.3)
EOSINOPHIL # BLD AUTO: 0.2 X10(3) UL (ref 0–0.7)
EOSINOPHIL NFR BLD AUTO: 3.9 %
ERYTHROCYTE [DISTWIDTH] IN BLOOD BY AUTOMATED COUNT: 13.8 % (ref 11–15)
FASTING PATIENT LIPID ANSWER: NO
FASTING STATUS PATIENT QL REPORTED: NO
GFR SERPLBLD BASED ON 1.73 SQ M-ARVRAT: 78 ML/MIN/1.73M2 (ref 60–?)
GLOBULIN PLAS-MCNC: 3.1 G/DL (ref 2.8–4.4)
GLUCOSE BLD-MCNC: 97 MG/DL (ref 70–99)
HCT VFR BLD AUTO: 47.6 %
HDLC SERPL-MCNC: 59 MG/DL (ref 40–59)
HGB BLD-MCNC: 15.8 G/DL
IMM GRANULOCYTES # BLD AUTO: 0.01 X10(3) UL (ref 0–1)
IMM GRANULOCYTES NFR BLD: 0.2 %
LDLC SERPL CALC-MCNC: 112 MG/DL (ref ?–100)
LYMPHOCYTES # BLD AUTO: 1.79 X10(3) UL (ref 1–4)
LYMPHOCYTES NFR BLD AUTO: 35.3 %
MCH RBC QN AUTO: 31.4 PG (ref 26–34)
MCHC RBC AUTO-ENTMCNC: 33.2 G/DL (ref 31–37)
MCV RBC AUTO: 94.6 FL
MONOCYTES # BLD AUTO: 0.64 X10(3) UL (ref 0.1–1)
MONOCYTES NFR BLD AUTO: 12.6 %
NEUTROPHILS # BLD AUTO: 2.4 X10 (3) UL (ref 1.5–7.7)
NEUTROPHILS # BLD AUTO: 2.4 X10(3) UL (ref 1.5–7.7)
NEUTROPHILS NFR BLD AUTO: 47.4 %
NONHDLC SERPL-MCNC: 121 MG/DL (ref ?–130)
OSMOLALITY SERPL CALC.SUM OF ELEC: 296 MOSM/KG (ref 275–295)
PLATELET # BLD AUTO: 283 10(3)UL (ref 150–450)
POTASSIUM SERPL-SCNC: 5 MMOL/L (ref 3.5–5.1)
PROT SERPL-MCNC: 6.7 G/DL (ref 6.4–8.2)
RBC # BLD AUTO: 5.03 X10(6)UL
SODIUM SERPL-SCNC: 143 MMOL/L (ref 136–145)
TRIGL SERPL-MCNC: 46 MG/DL (ref 30–149)
TSI SER-ACNC: 1.86 MIU/ML (ref 0.36–3.74)
VLDLC SERPL CALC-MCNC: 8 MG/DL (ref 0–30)
WBC # BLD AUTO: 5.1 X10(3) UL (ref 4–11)

## 2022-09-01 PROCEDURE — 84443 ASSAY THYROID STIM HORMONE: CPT

## 2022-09-01 PROCEDURE — 80053 COMPREHEN METABOLIC PANEL: CPT

## 2022-09-01 PROCEDURE — 80061 LIPID PANEL: CPT

## 2022-09-01 PROCEDURE — 85025 COMPLETE CBC W/AUTO DIFF WBC: CPT

## 2022-09-01 PROCEDURE — 36415 COLL VENOUS BLD VENIPUNCTURE: CPT

## 2023-01-05 ENCOUNTER — OFFICE VISIT (OUTPATIENT)
Dept: INTERNAL MEDICINE CLINIC | Facility: CLINIC | Age: 73
End: 2023-01-05
Payer: MEDICARE

## 2023-01-05 VITALS
WEIGHT: 221 LBS | BODY MASS INDEX: 31.64 KG/M2 | SYSTOLIC BLOOD PRESSURE: 140 MMHG | HEART RATE: 52 BPM | TEMPERATURE: 98 F | HEIGHT: 69.9 IN | DIASTOLIC BLOOD PRESSURE: 80 MMHG

## 2023-01-05 DIAGNOSIS — M70.72 ILIOPSOAS BURSITIS OF BOTH HIPS: Primary | ICD-10-CM

## 2023-01-05 DIAGNOSIS — M70.71 ILIOPSOAS BURSITIS OF BOTH HIPS: Primary | ICD-10-CM

## 2023-01-05 PROCEDURE — 99213 OFFICE O/P EST LOW 20 MIN: CPT | Performed by: INTERNAL MEDICINE

## 2023-05-01 ENCOUNTER — PATIENT MESSAGE (OUTPATIENT)
Dept: SURGERY | Facility: CLINIC | Age: 73
End: 2023-05-01

## 2023-05-01 DIAGNOSIS — Z80.42 FAMILY HISTORY OF PROSTATE CANCER: Primary | ICD-10-CM

## 2023-05-01 DIAGNOSIS — N40.1 BPH WITH OBSTRUCTION/LOWER URINARY TRACT SYMPTOMS: ICD-10-CM

## 2023-05-01 DIAGNOSIS — N13.8 BPH WITH OBSTRUCTION/LOWER URINARY TRACT SYMPTOMS: ICD-10-CM

## 2023-05-02 ENCOUNTER — LAB ENCOUNTER (OUTPATIENT)
Dept: LAB | Facility: HOSPITAL | Age: 73
End: 2023-05-02
Attending: UROLOGY
Payer: MEDICARE

## 2023-05-02 DIAGNOSIS — Z80.42 FAMILY HISTORY OF PROSTATE CANCER: ICD-10-CM

## 2023-05-02 DIAGNOSIS — N13.8 BPH WITH OBSTRUCTION/LOWER URINARY TRACT SYMPTOMS: ICD-10-CM

## 2023-05-02 DIAGNOSIS — N40.1 BPH WITH OBSTRUCTION/LOWER URINARY TRACT SYMPTOMS: ICD-10-CM

## 2023-05-02 LAB — COMPLEXED PSA SERPL-MCNC: 1.01 NG/ML (ref ?–4)

## 2023-05-02 PROCEDURE — 36415 COLL VENOUS BLD VENIPUNCTURE: CPT

## 2023-05-04 ENCOUNTER — TELEPHONE (OUTPATIENT)
Dept: SURGERY | Facility: CLINIC | Age: 73
End: 2023-05-04

## 2023-05-04 ENCOUNTER — OFFICE VISIT (OUTPATIENT)
Dept: SURGERY | Facility: CLINIC | Age: 73
End: 2023-05-04

## 2023-05-04 DIAGNOSIS — R39.9 LOWER URINARY TRACT SYMPTOMS: ICD-10-CM

## 2023-05-04 DIAGNOSIS — Z80.42 FAMILY HISTORY OF PROSTATE CANCER: Primary | ICD-10-CM

## 2023-05-04 DIAGNOSIS — Z12.5 SCREENING PSA (PROSTATE SPECIFIC ANTIGEN): ICD-10-CM

## 2023-05-04 PROCEDURE — 99213 OFFICE O/P EST LOW 20 MIN: CPT | Performed by: UROLOGY

## 2023-05-04 RX ORDER — AMPICILLIN TRIHYDRATE 250 MG
CAPSULE ORAL
COMMUNITY
Start: 2023-01-02

## 2023-05-04 NOTE — TELEPHONE ENCOUNTER
Tried calling pt times 3 just rings and then goes to voice mail, so I called pt wife (number on file) LM for pt to call the office back regarding nadja this morning that needs to be rescheduled due to [facility] emergency surgery. We can offer pt 12:20pm today on [facility] schedule. I am sending pt cookdinner message as well.

## 2023-09-28 ENCOUNTER — LAB ENCOUNTER (OUTPATIENT)
Dept: LAB | Age: 73
End: 2023-09-28
Attending: INTERNAL MEDICINE
Payer: MEDICARE

## 2023-09-28 ENCOUNTER — OFFICE VISIT (OUTPATIENT)
Dept: INTERNAL MEDICINE CLINIC | Facility: CLINIC | Age: 73
End: 2023-09-28

## 2023-09-28 VITALS
SYSTOLIC BLOOD PRESSURE: 112 MMHG | BODY MASS INDEX: 31.84 KG/M2 | WEIGHT: 215 LBS | HEIGHT: 68.9 IN | DIASTOLIC BLOOD PRESSURE: 80 MMHG | TEMPERATURE: 98 F | HEART RATE: 63 BPM | OXYGEN SATURATION: 98 %

## 2023-09-28 DIAGNOSIS — Z12.5 SCREENING PSA (PROSTATE SPECIFIC ANTIGEN): ICD-10-CM

## 2023-09-28 DIAGNOSIS — R53.83 OTHER FATIGUE: ICD-10-CM

## 2023-09-28 DIAGNOSIS — H91.90 HEARING LOSS, UNSPECIFIED HEARING LOSS TYPE, UNSPECIFIED LATERALITY: ICD-10-CM

## 2023-09-28 DIAGNOSIS — Z00.00 PHYSICAL EXAM, ANNUAL: Primary | ICD-10-CM

## 2023-09-28 DIAGNOSIS — K63.5 POLYP OF COLON, UNSPECIFIED PART OF COLON, UNSPECIFIED TYPE: ICD-10-CM

## 2023-09-28 DIAGNOSIS — E78.00 HYPERCHOLESTEREMIA: ICD-10-CM

## 2023-09-28 LAB
ALBUMIN SERPL-MCNC: 3.7 G/DL (ref 3.4–5)
ALBUMIN/GLOB SERPL: 1 {RATIO} (ref 1–2)
ALP LIVER SERPL-CCNC: 52 U/L
ALT SERPL-CCNC: 29 U/L
ANION GAP SERPL CALC-SCNC: 6 MMOL/L (ref 0–18)
AST SERPL-CCNC: 20 U/L (ref 15–37)
BASOPHILS # BLD AUTO: 0.05 X10(3) UL (ref 0–0.2)
BASOPHILS NFR BLD AUTO: 0.8 %
BILIRUB SERPL-MCNC: 1.4 MG/DL (ref 0.1–2)
BUN BLD-MCNC: 17 MG/DL (ref 7–18)
BUN/CREAT SERPL: 15.3 (ref 10–20)
CALCIUM BLD-MCNC: 8.7 MG/DL (ref 8.5–10.1)
CHLORIDE SERPL-SCNC: 111 MMOL/L (ref 98–112)
CHOLEST SERPL-MCNC: 186 MG/DL (ref ?–200)
CO2 SERPL-SCNC: 25 MMOL/L (ref 21–32)
CREAT BLD-MCNC: 1.11 MG/DL
DEPRECATED RDW RBC AUTO: 49.1 FL (ref 35.1–46.3)
EGFRCR SERPLBLD CKD-EPI 2021: 71 ML/MIN/1.73M2 (ref 60–?)
EOSINOPHIL # BLD AUTO: 0.26 X10(3) UL (ref 0–0.7)
EOSINOPHIL NFR BLD AUTO: 4.4 %
ERYTHROCYTE [DISTWIDTH] IN BLOOD BY AUTOMATED COUNT: 14.2 % (ref 11–15)
FASTING PATIENT LIPID ANSWER: YES
FASTING STATUS PATIENT QL REPORTED: YES
GLOBULIN PLAS-MCNC: 3.8 G/DL (ref 2.8–4.4)
GLUCOSE BLD-MCNC: 101 MG/DL (ref 70–99)
HCT VFR BLD AUTO: 47.7 %
HDLC SERPL-MCNC: 73 MG/DL (ref 40–59)
HGB BLD-MCNC: 16.2 G/DL
IMM GRANULOCYTES # BLD AUTO: 0.01 X10(3) UL (ref 0–1)
IMM GRANULOCYTES NFR BLD: 0.2 %
LDLC SERPL CALC-MCNC: 105 MG/DL (ref ?–100)
LYMPHOCYTES # BLD AUTO: 2.27 X10(3) UL (ref 1–4)
LYMPHOCYTES NFR BLD AUTO: 38.5 %
MCH RBC QN AUTO: 31.8 PG (ref 26–34)
MCHC RBC AUTO-ENTMCNC: 34 G/DL (ref 31–37)
MCV RBC AUTO: 93.5 FL
MONOCYTES # BLD AUTO: 0.74 X10(3) UL (ref 0.1–1)
MONOCYTES NFR BLD AUTO: 12.6 %
NEUTROPHILS # BLD AUTO: 2.56 X10 (3) UL (ref 1.5–7.7)
NEUTROPHILS # BLD AUTO: 2.56 X10(3) UL (ref 1.5–7.7)
NEUTROPHILS NFR BLD AUTO: 43.5 %
NONHDLC SERPL-MCNC: 113 MG/DL (ref ?–130)
OSMOLALITY SERPL CALC.SUM OF ELEC: 296 MOSM/KG (ref 275–295)
PLATELET # BLD AUTO: 268 10(3)UL (ref 150–450)
POTASSIUM SERPL-SCNC: 4.1 MMOL/L (ref 3.5–5.1)
PROT SERPL-MCNC: 7.5 G/DL (ref 6.4–8.2)
RBC # BLD AUTO: 5.1 X10(6)UL
SODIUM SERPL-SCNC: 142 MMOL/L (ref 136–145)
TRIGL SERPL-MCNC: 37 MG/DL (ref 30–149)
TSI SER-ACNC: 3.21 MIU/ML (ref 0.36–3.74)
VLDLC SERPL CALC-MCNC: 6 MG/DL (ref 0–30)
WBC # BLD AUTO: 5.9 X10(3) UL (ref 4–11)

## 2023-09-28 PROCEDURE — G0439 PPPS, SUBSEQ VISIT: HCPCS | Performed by: INTERNAL MEDICINE

## 2023-09-28 PROCEDURE — 80053 COMPREHEN METABOLIC PANEL: CPT

## 2023-09-28 PROCEDURE — 84443 ASSAY THYROID STIM HORMONE: CPT

## 2023-09-28 PROCEDURE — 1126F AMNT PAIN NOTED NONE PRSNT: CPT | Performed by: INTERNAL MEDICINE

## 2023-09-28 PROCEDURE — 85025 COMPLETE CBC W/AUTO DIFF WBC: CPT

## 2023-09-28 PROCEDURE — 80061 LIPID PANEL: CPT

## 2023-09-28 PROCEDURE — 36415 COLL VENOUS BLD VENIPUNCTURE: CPT

## 2023-10-23 ENCOUNTER — HOSPITAL ENCOUNTER (OUTPATIENT)
Dept: CT IMAGING | Age: 73
Discharge: HOME | End: 2023-10-23
Attending: INTERNAL MEDICINE

## 2023-10-23 DIAGNOSIS — Z13.6 SCREENING FOR CARDIOVASCULAR CONDITION: ICD-10-CM

## 2024-01-04 ENCOUNTER — OFFICE VISIT (OUTPATIENT)
Dept: INTERNAL MEDICINE CLINIC | Facility: CLINIC | Age: 74
End: 2024-01-04

## 2024-01-04 ENCOUNTER — OFFICE VISIT (OUTPATIENT)
Dept: DERMATOLOGY CLINIC | Facility: CLINIC | Age: 74
End: 2024-01-04
Payer: MEDICARE

## 2024-01-04 VITALS
BODY MASS INDEX: 32.73 KG/M2 | TEMPERATURE: 97 F | HEART RATE: 60 BPM | RESPIRATION RATE: 18 BRPM | HEIGHT: 68.9 IN | SYSTOLIC BLOOD PRESSURE: 128 MMHG | OXYGEN SATURATION: 97 % | WEIGHT: 221 LBS | DIASTOLIC BLOOD PRESSURE: 80 MMHG

## 2024-01-04 DIAGNOSIS — D49.2 NEOPLASM OF UNSPECIFIED BEHAVIOR OF BONE, SOFT TISSUE, AND SKIN: Primary | ICD-10-CM

## 2024-01-04 DIAGNOSIS — R22.9 SKIN NODULE: Primary | ICD-10-CM

## 2024-01-04 PROCEDURE — 88305 TISSUE EXAM BY PATHOLOGIST: CPT | Performed by: STUDENT IN AN ORGANIZED HEALTH CARE EDUCATION/TRAINING PROGRAM

## 2024-01-04 PROCEDURE — 11102 TANGNTL BX SKIN SINGLE LES: CPT | Performed by: STUDENT IN AN ORGANIZED HEALTH CARE EDUCATION/TRAINING PROGRAM

## 2024-01-04 PROCEDURE — 1126F AMNT PAIN NOTED NONE PRSNT: CPT | Performed by: STUDENT IN AN ORGANIZED HEALTH CARE EDUCATION/TRAINING PROGRAM

## 2024-01-04 PROCEDURE — 99213 OFFICE O/P EST LOW 20 MIN: CPT | Performed by: INTERNAL MEDICINE

## 2024-01-04 PROCEDURE — 1126F AMNT PAIN NOTED NONE PRSNT: CPT | Performed by: INTERNAL MEDICINE

## 2024-01-04 NOTE — PROGRESS NOTES
New Patient    Referred by: No referring provider defined for this encounter.    CHIEF COMPLAINT: Lesion of concern     HISTORY OF PRESENT ILLNESS: Luis Chapa is a 73 year old male here for evaluation of lesion of concern.    1. Growth   Location: Right Upper arm  Duration: A month  Signs and symptoms: NONE  Current treatment: Compound W but didn't work      Personal Dermatologic History  History of skin cancer: No  History of  atypical moles: No    FAMILY HISTORY:  History of melanoma: No    Past Medical History  Past Medical History:   Diagnosis Date    BPH (benign prostatic hypertrophy) 12/13/2011    Sees KENYA Ayala in [facility]     podiatrist Dr Albright; s/p left bunionectomy 2010    Colon polyps 2010    GI Dr Augustine Estevez; last colonoscopy in 1/4/2010    Family history of coronary artery disease 12/13/2011    Sees Dr. Alvarez at [facility] Fax [phone]; last stress test 2012    Hypercholesterolemia 4/18/2015    Inguinal hernia, bilateral     s/p repair with mesh in Jan 2017;  Dr Sheets    Pulmonary embolism (HCC) 4/14/2015    treated with 6 months of xeralto worked up by  dr arteaga       REVIEW OF SYSTEMS:  Constitutional: Denies fever, chills, unintentional weight loss.   Skin as per HPI    Medications  Current Outpatient Medications   Medication Sig Dispense Refill    Red Yeast Rice 600 MG Oral Cap       Cholecalciferol (VITAMIN D3) 1000 units Oral Cap Take 1 tablet by mouth daily.      Omega-3 Fatty Acids (FISH OIL) 1200 MG Oral Cap Take 1 capsule by mouth daily.  0    Multiple Vitamins-Minerals (ICAPS) Oral Cap Take 1 capsule by mouth daily. 30 capsule 0       PHYSICAL EXAM:  General: awake, alert, no acute distress  Neuropsych: appropriate mood and affect  Eyes: Sclerae anicteric, without conjunctival injection, eyelids unremarkable  Skin: Skin exam was performed today including the following: R upper arm. Pertinent findings include:   - with a crateriform papule    ASSESSMENT &  PLAN:  Pathophysiology of diagnoses discussed with patient.  Therapeutic options reviewed. Risks, benefits, and alternatives discussed with patient. Instructions reviewed at length.    #Neoplasm(s) of uncertain behavior of skin  - Shave biopsy performed today   - Will notify patient with results and arrange for appropriate definitive treatment, if indicated.      Shave of lesion to establish and confirm diagnosis:  Photo taken: Yes    Risks, benefits, alternatives and personnel required for shave biopsy reviewed with patient. Risks discussed include, but not limited to: pain, bleeding, infection, scar, reaction to anesthetic, and recurrence/need for further treatment.  Patient and physician agree as to site(s) to be biopsied. Patient verbalizes understanding and wishes to proceed.     Site(s) prepped with alcohol and anesthetized with 1% lidocaine with epinephrine.   Shave of lesion(s) performed to the level of the dermis. Specimen(s) from A. R upper arm  sent for pathology to r/o SCC 50% ALCL and bandaging applied.   Written and verbal wound care instructions provided to patient, understanding verbalized.    Return to clinic: pending biopsy or sooner if something concerning arises     Pasquale Coyne MD

## 2024-01-04 NOTE — PROGRESS NOTES
Luis Chapa is a 73 year old male.    HPI:     Chief Complaint   Patient presents with    Checkup     Bump on right arm/above elbow       74 y/o M with onset right upper arm skin nodule over that last 8 weeks; no pain; no drainage; no F/C      HISTORY:  Past Medical History:   Diagnosis Date    BPH (benign prostatic hypertrophy) 2011    Sees  Dr. Ayala in [facility]     podiatrist Dr Albright; s/p left bunionectomy     Colon polyps     GI Dr Augustine Estevez; last colonoscopy in 2010    Family history of coronary artery disease 2011    Sees Dr. Alvarez at [facility] [place] Fax [phone]; last stress test     Hypercholesterolemia 2015    Inguinal hernia, bilateral     s/p repair with mesh in 2017;  Dr Sheets    Pulmonary embolism (HCC) 2015    treated with 6 months of xeralto worked up by  dr arteaga      Past Surgical History:   Procedure Laterality Date    COLONOSCOPY  2009    Polyps-Repeat     COLONOSCOPY  2004    Polyps    COLONOSCOPY  10/2015    COLONOSCOPY  2020    COLONOSCOPY N/A 2020    Procedure: COLONOSCOPY, POSSIBLE BIOPSY, POSSIBLE POLYPECTOMY 73029;  Surgeon: Venkatesh Estevez MD;  Location: [facility]    HERNIA SURGERY  2017    bilat ingunial hernia    OTHER SURGICAL HISTORY      Right bunion surgery with Dr. Albright    VASECTOMY        Family History   Problem Relation Age of Onset    Heart Disorder Father 45        MI    Diabetes Father     Cancer Father         Prostate CA    Other (Other) Father     Heart Disorder Mother          at age 97    Other (Other) Sister         PBC    Heart Disorder Paternal Grandfather 45        mi    Other (liver disease) Sister     Other (alive and well) Sister         x3    Obesity Brother     Other (CVA) Daughter 38    Other (alive and well) Daughter     Other (alive and well) Son       Social History:   Social History     Socioeconomic History    Marital status:      Number of children: 3   Occupational History    Occupation: [employer]   Tobacco Use    Smoking status: Former     Packs/day: 0.50     Years: 20.00     Additional pack years: 0.00     Total pack years: 10.00     Types: Pipe, Cigarettes     Quit date: 1995     Years since quittin.0     Passive exposure: Past    Smokeless tobacco: Never    Tobacco comments:     [email or web address]   Vaping Use    Vaping Use: Never used   Substance and Sexual Activity    Alcohol use: Yes     Alcohol/week: 14.0 standard drinks of alcohol     Types: 7 Glasses of wine, 7 Shots of liquor per week     Comment: Daily glass of wine    Drug use: No   Other Topics Concern     Service No    Caffeine Concern Yes     Comment: 3 cups coffee per day    Occupational Exposure No    Pt has a pacemaker No    Pt has a defibrillator No    Reaction to local anesthetic No        Medications (Active prior to today's visit):  Current Outpatient Medications   Medication Sig Dispense Refill    Red Yeast Rice 600 MG Oral Cap       Cholecalciferol (VITAMIN D3) 1000 units Oral Cap Take 1 tablet by mouth daily.      Omega-3 Fatty Acids (FISH OIL) 1200 MG Oral Cap Take 1 capsule by mouth daily.  0    Multiple Vitamins-Minerals (ICAPS) Oral Cap Take 1 capsule by mouth daily. 30 capsule 0       Allergies:  No Known Allergies              ROS:   Constitutional: no weight loss; no fatigue  Cardiovascular:  Negative for chest pain; negative palpitations  Respiratory:  Negative for cough, dyspnea and wheezing  Gastrointestinal:  Negative for abdominal pain, constipation, decreased appetite, diarrhea and vomiting; no melena or hematochezia  All other review of systems are negative.        PHYSICAL EXAM:   Blood pressure 128/80, pulse 60, temperature 97 °F (36.1 °C), temperature source Tympanic, resp. rate 18, height 5' 8.9\" (1.75 m), weight 221 lb (100.2 kg), SpO2 97%.  Constitutional: alert and oriented x3 in no acute distress  HEENT- EOMI, PERRL  Nose/Mouth/Throat: pharynx  without erythema; no oral lesions  Neck/Thyroid: neck supple; no thyromegaly  Cardiovascular: RRR, S1, S2, no S3 or murmur  Respiratory: lungs without crackles or wheezes  Abdomen: normoactive bowel sounds, soft, non-tender and non-distended  Extremities: no clubbing, cyanosis or edema  Skin : 6 x 8 mm skin nodule to posterior aspect of RUE; no ulceration;         ASSESSMENT/PLAN:   Skin nodule  Has 6 x 8 mm skin nodule to posterior aspect of RUE; no ulceration; possible pyogenic granuloma  -advise dermatology consult Dr Law    Health Maintenance  Prevnar 13 give m in 2019; Pneumovax 23 given Aug 2020; sees ophtho Dr Marcelino Gutierres for annual eye exam; check CBC, CMP, TSH, Lipids     History of bilateral inguinal hernia repair  S/p repair with mesh in Jan 2017 per Dr Clark     Asthma   on Ventolin HFA 2 p QID prn; uses rarely; consider inhaled steroid in future; remote smoking (quit 1995, previous pipe smoker x 20 years)     History of Pulmonary embolus (HCC)  Dx 4/9/15 with multiple scattered bilateral  PEs; venous Doppler neg for DVT; PE suspected to be exacerbated by immobility at work; completed Xarelto x 6 mos; w/u for lupus anticoagulant and ATIII negative; Xarelto stopped in Oct 2015; as noted by Dr Patterson, no further w/u and no further need for anticoagulation unless he has a subsequent event; pt advised for VTE prophylaxis during high risk situations     Hypercholesterolemia    in Sept 2022; CT calcium score was 1 in Aug 2017     PSA screening  PSA testing per  Dr Clancy;  PSA 1.01 in May 2023     Family history of CAD  Last stress test in 2012     Colon polyps  Pt had colonoscopy on 9/30/20 by Dr Estevez; next colonoscopy in 7-10 years per Dr Estevez     Large nevus  To mid-abdomen; 14 x 12 mm in size; pt seen by dermatologist Dr Egan in 2017; was advised for observation; no change in size     Aortic atherosclerosis  Seen on CT chest on 2015     History of left lateral epicondylitis  Improved;  F/U Dr Lynch     Hearing loss  Sees ENT Dr Melgoza; has hearing aides           RTC 1 year  MWE 9/28/23  Cell [phone]     Spent 30 minutes obtaining history, evaluating patient, discussing treatment options, diet, exercise, review of available labs and radiology reports, and completing documentation.             Orders This Visit:  No orders of the defined types were placed in this encounter.      Meds This Visit:  Requested Prescriptions      No prescriptions requested or ordered in this encounter       Imaging & Referrals:  None     1/4/2024  Bruno Sinclair MD

## 2024-01-08 ENCOUNTER — TELEPHONE (OUTPATIENT)
Dept: DERMATOLOGY CLINIC | Facility: CLINIC | Age: 74
End: 2024-01-08

## 2024-01-08 NOTE — PROGRESS NOTES
Logged in path book and pmh.  Pt informed of pathology results and recommendations for treatment.  Pt provided with contact information for Dr. Miranda.  Pt advised to schedule a fbse in the next 3 months.

## 2024-01-29 ENCOUNTER — OFFICE VISIT (OUTPATIENT)
Dept: SURGERY | Facility: CLINIC | Age: 74
End: 2024-01-29
Payer: MEDICARE

## 2024-01-29 DIAGNOSIS — C44.622 SQUAMOUS CELL CARCINOMA OF SKIN OF RIGHT UPPER LIMB, INCLUDING SHOULDER: Primary | ICD-10-CM

## 2024-01-29 PROCEDURE — 1125F AMNT PAIN NOTED PAIN PRSNT: CPT | Performed by: PLASTIC SURGERY

## 2024-01-29 PROCEDURE — 99204 OFFICE O/P NEW MOD 45 MIN: CPT | Performed by: PLASTIC SURGERY

## 2024-01-29 NOTE — PROGRESS NOTES
Patient request for surgery signed by patient and witnessed and signed by RN.  Patient to take OTC meds post-operatively; patient instructed to call the office if post-operative pain is not relieved w/OTC meds.  Pre-Surgical Instruction Handout given to and reviewed w/patient.  All questions and concerns answered; pt verbalized an understanding of all pre-operative teaching.  Patient instructed to call the office with any further questions and/or concerns.  Patient escorted to surgery scheduling to schedule surgery and post-operative appointments.

## 2024-01-29 NOTE — H&P (VIEW-ONLY)
Luis Chapa is a 73 year old male that presents with   Chief Complaint   Patient presents with    Lesion     Right upper arm   .    REFERRED BY:  Pasquale Coyne    Pacemaker: No  Latex Allergy: no  Coumadin: No  Plavix: No  Other anticoagulants: No  Diet medication: No  Cardiac stents: No    HAND DOMINANCE:  Right    Profession: Retired    RECONSTRUCTIVE HISTORY    SUN EXPOSURE   Current yes   Past yes   Sunburns no   Tanning salons current no   Tanning salons past no     SKIN CANCER    Personal history of skin cancer: none      HPI:       73-year-old male right-hand-dominant with SCC of the right arm    2 months duration    Pain and increase in size    Biopsy reveals SCC    History of sun exposure currently and in the past          Review of Systems:   Constitutional: No change in appetite, chill/rigors, or fatigue  GI: No jaundice  Endocrine: No generalized weakness  Neurological: No aphasia, loss of consciousness, or seizures       Integumentary:     LESION 1:    Location SCC Right upper arm    Onset:   2 months    Pain:   yes    Itching:   no    Bleeding:  no    Infection:  no    Size change?  increased:    Color change?  no    Biopsy?:   SCC 1/4/24         PMH:     MEDICAL  Past Medical History:   Diagnosis Date    BPH (benign prostatic hypertrophy) 12/13/2011    Sees  Dr. Ayala in [facility]     podiatrist Dr Albright; s/p left bunionectomy 2010    Colon polyps 2010    GI Dr Augustine Estevez; last colonoscopy in 1/4/2010    Family history of coronary artery disease 12/13/2011    Sees Dr. Alvarez at [facility] Fax [phone]; last stress test 2012    Hypercholesterolemia 04/18/2015    Inguinal hernia, bilateral     s/p repair with mesh in Jan 2017;  Dr Sheets    Pulmonary embolism (HCC) 04/14/2015    treated with 6 months of xeralto worked up by  dr arteaga    SCC (squamous cell carcinoma) 01/2024    Right upper arm        SURGICAL  Past Surgical History:   Procedure Laterality Date    COLONOSCOPY   2009    Polyps-Repeat     COLONOSCOPY  2004    Polyps    COLONOSCOPY  10/2015    COLONOSCOPY  2020    COLONOSCOPY N/A 2020    Procedure: COLONOSCOPY, POSSIBLE BIOPSY, POSSIBLE POLYPECTOMY 35309;  Surgeon: Venkatesh Estevez MD;  [facility]: [facility], [facility]    HERNIA SURGERY  2017    bilat ingunial hernia    OTHER SURGICAL HISTORY      Right bunion surgery with Dr. Albright    VASECTOMY          ALLERIGIES  No Known Allergies     MEDICATIONS  Current Outpatient Medications   Medication Sig Dispense Refill    Red Yeast Rice 600 MG Oral Cap       Cholecalciferol (VITAMIN D3) 1000 units Oral Cap Take 1 tablet by mouth daily.      Omega-3 Fatty Acids (FISH OIL) 1200 MG Oral Cap Take 1 capsule by mouth daily.  0    Multiple Vitamins-Minerals (ICAPS) Oral Cap Take 1 capsule by mouth daily. 30 capsule 0        SOCIAL HISTORY  Social History     Socioeconomic History    Marital status:     Number of children: 3   Occupational History    Occupation: CPA   Tobacco Use    Smoking status: Former     Packs/day: 0.50     Years: 20.00     Additional pack years: 0.00     Total pack years: 10.00     Types: Pipe, Cigarettes     Quit date: 1995     Years since quittin.1     Passive exposure: Past    Smokeless tobacco: Never    Tobacco comments:     yuck   Vaping Use    Vaping Use: Never used   Substance and Sexual Activity    Alcohol use: Yes     Alcohol/week: 14.0 standard drinks of alcohol     Types: 7 Glasses of wine, 7 Shots of liquor per week     Comment: Daily glass of wine    Drug use: No   Other Topics Concern     Service No    Caffeine Concern Yes     Comment: 3 cups coffee per day    Occupational Exposure No    Pt has a pacemaker No    Pt has a defibrillator No    Reaction to local anesthetic No    Right Handed Yes        FAMILY HISTORY  Family History   Problem Relation Age of Onset    Heart Disorder Father 45        MI    Diabetes Father     Cancer Father          Prostate CA    Other (Other) Father     Heart Disorder Mother          at age 97    Other (Other) Sister         PBC    Heart Disorder Paternal Grandfather 45        mi    Other (liver disease) Sister     Other (alive and well) Sister         x3    Obesity Brother     Other (CVA) Daughter 38    Other (alive and well) Daughter     Other (alive and well) Son           PHYSICAL EXAM:     CONSTITUTIONAL: Overall appearance - Normal  HEENT: Normocephalic  EYES: Conjunctiva - Right: Normal, Left: Normal; EOMI  EARS: Inspection - Right: Normal, Left: Normal  NECK/THYROID: Inspection - Normal, Palpation - Normal, Thyroid gland - Normal, No adenopathy  RESPIRATORY: Inspection - Normal, Effort - Normal  CARDIOVASCULAR: Regular rhythm, No murmurs  ABDOMEN: Inspection - Normal, No abdominal tenderness  NEURO: Memory intact  PSYCH: Oriented to person, place, time, and situation, Appropriate mood and affect      Integument Physical Exam:       Right distal arm 1 cm centrally keratotic lesion with moderate lax skin      ASSESSMENT/PLAN:     IMPRESSION:    SCC right arm        We had a long discussion.  I explained to the patient the nature of this lesion / these lesions, as well as treatment options.    WIDE EXCISION: treatment requires wide excision.  A scar will result, which will be permanent.  The scar will be considerably longer than the size of the lesion, as we need to excise margins around the tumor.  Further excision may be necessary, if tumor is present microscopically on the pathology report.  This may result in a longer scar.  Recurrence may occur, which will require further surgery.        TREATMENT:    Questions were answered and the patient wishes to proceed with treatment.    Wide excision under frozen section control    RISKS:     Bleeding  Infection  Scar  Need for further surgery  Anesthesia risks           2024  Handy Miranda,  MD        +++++++++++++++++++++++++++++++++++++++++++++++++    MEDICAL DECISION MAKING    PROBLEMS      MODERATE    (number / complexity)          Undiagnosed new problem with uncertain prognosis    DATA         STRAIGHTFORWARD    (amount / complexity)           MANAGEMENT RISK  MODERATE    (complications/ morbidity)       Minor surgery with risk factors                  MDM LEVEL    MODERATE

## 2024-01-29 NOTE — H&P
Luis Chapa is a 73 year old male that presents with   Chief Complaint   Patient presents with    Lesion     Right upper arm   .    REFERRED BY:  Pasquale Coyne    Pacemaker: No  Latex Allergy: no  Coumadin: No  Plavix: No  Other anticoagulants: No  Diet medication: No  Cardiac stents: No    HAND DOMINANCE:  Right    Profession: Retired    RECONSTRUCTIVE HISTORY    SUN EXPOSURE   Current yes   Past yes   Sunburns no   Tanning salons current no   Tanning salons past no     SKIN CANCER    Personal history of skin cancer: none      HPI:       73-year-old male right-hand-dominant with SCC of the right arm    2 months duration    Pain and increase in size    Biopsy reveals SCC    History of sun exposure currently and in the past          Review of Systems:   Constitutional: No change in appetite, chill/rigors, or fatigue  GI: No jaundice  Endocrine: No generalized weakness  Neurological: No aphasia, loss of consciousness, or seizures       Integumentary:     LESION 1:    Location SCC Right upper arm    Onset:   2 months    Pain:   yes    Itching:   no    Bleeding:  no    Infection:  no    Size change?  increased:    Color change?  no    Biopsy?:   SCC 1/4/24         PMH:     MEDICAL  Past Medical History:   Diagnosis Date    BPH (benign prostatic hypertrophy) 12/13/2011    Sees  Dr. Ayala in [facility]     podiatrist Dr Albright; s/p left bunionectomy 2010    Colon polyps 2010    GI Dr Augustine Estevez; last colonoscopy in 1/4/2010    Family history of coronary artery disease 12/13/2011    Sees Dr. Alvarez at [facility] Fax [phone]; last stress test 2012    Hypercholesterolemia 04/18/2015    Inguinal hernia, bilateral     s/p repair with mesh in Jan 2017;  Dr Sheets    Pulmonary embolism (HCC) 04/14/2015    treated with 6 months of xeralto worked up by  dr arteaga    SCC (squamous cell carcinoma) 01/2024    Right upper arm        SURGICAL  Past Surgical History:   Procedure Laterality Date    COLONOSCOPY   2009    Polyps-Repeat     COLONOSCOPY  2004    Polyps    COLONOSCOPY  10/2015    COLONOSCOPY  2020    COLONOSCOPY N/A 2020    Procedure: COLONOSCOPY, POSSIBLE BIOPSY, POSSIBLE POLYPECTOMY 18733;  Surgeon: Venkatesh Estevez MD;  [facility]: [facility]    HERNIA SURGERY  2017    bilat ingunial hernia    OTHER SURGICAL HISTORY      Right bunion surgery with Dr. Albright    VASECTOMY          ALLERIGIES  No Known Allergies     MEDICATIONS  Current Outpatient Medications   Medication Sig Dispense Refill    Red Yeast Rice 600 MG Oral Cap       Cholecalciferol (VITAMIN D3) 1000 units Oral Cap Take 1 tablet by mouth daily.      Omega-3 Fatty Acids (FISH OIL) 1200 MG Oral Cap Take 1 capsule by mouth daily.  0    Multiple Vitamins-Minerals (ICAPS) Oral Cap Take 1 capsule by mouth daily. 30 capsule 0        SOCIAL HISTORY  Social History     Socioeconomic History    Marital status:     Number of children: 3   Occupational History    Occupation: CPA   Tobacco Use    Smoking status: Former     Packs/day: 0.50     Years: 20.00     Additional pack years: 0.00     Total pack years: 10.00     Types: Pipe, Cigarettes     Quit date: 1995     Years since quittin.1     Passive exposure: Past    Smokeless tobacco: Never    Tobacco comments:     yuck   Vaping Use    Vaping Use: Never used   Substance and Sexual Activity    Alcohol use: Yes     Alcohol/week: 14.0 standard drinks of alcohol     Types: 7 Glasses of wine, 7 Shots of liquor per week     Comment: Daily glass of wine    Drug use: No   Other Topics Concern     Service No    Caffeine Concern Yes     Comment: 3 cups coffee per day    Occupational Exposure No    Pt has a pacemaker No    Pt has a defibrillator No    Reaction to local anesthetic No    Right Handed Yes        FAMILY HISTORY  Family History   Problem Relation Age of Onset    Heart Disorder Father 45        MI    Diabetes Father     Cancer Father          Prostate CA    Other (Other) Father     Heart Disorder Mother          at age 97    Other (Other) Sister         PBC    Heart Disorder Paternal Grandfather 45        mi    Other (liver disease) Sister     Other (alive and well) Sister         x3    Obesity Brother     Other (CVA) Daughter 38    Other (alive and well) Daughter     Other (alive and well) Son           PHYSICAL EXAM:     CONSTITUTIONAL: Overall appearance - Normal  HEENT: Normocephalic  EYES: Conjunctiva - Right: Normal, Left: Normal; EOMI  EARS: Inspection - Right: Normal, Left: Normal  NECK/THYROID: Inspection - Normal, Palpation - Normal, Thyroid gland - Normal, No adenopathy  RESPIRATORY: Inspection - Normal, Effort - Normal  CARDIOVASCULAR: Regular rhythm, No murmurs  ABDOMEN: Inspection - Normal, No abdominal tenderness  NEURO: Memory intact  PSYCH: Oriented to person, place, time, and situation, Appropriate mood and affect      Integument Physical Exam:       Right distal arm 1 cm centrally keratotic lesion with moderate lax skin      ASSESSMENT/PLAN:     IMPRESSION:    SCC right arm        We had a long discussion.  I explained to the patient the nature of this lesion / these lesions, as well as treatment options.    WIDE EXCISION: treatment requires wide excision.  A scar will result, which will be permanent.  The scar will be considerably longer than the size of the lesion, as we need to excise margins around the tumor.  Further excision may be necessary, if tumor is present microscopically on the pathology report.  This may result in a longer scar.  Recurrence may occur, which will require further surgery.        TREATMENT:    Questions were answered and the patient wishes to proceed with treatment.    Wide excision under frozen section control    RISKS:     Bleeding  Infection  Scar  Need for further surgery  Anesthesia risks           2024  Handy Miranda,  MD        +++++++++++++++++++++++++++++++++++++++++++++++++    MEDICAL DECISION MAKING    PROBLEMS      MODERATE    (number / complexity)          Undiagnosed new problem with uncertain prognosis    DATA         STRAIGHTFORWARD    (amount / complexity)           MANAGEMENT RISK  MODERATE    (complications/ morbidity)       Minor surgery with risk factors                  MDM LEVEL    MODERATE

## 2024-02-08 NOTE — DISCHARGE INSTRUCTIONS
Dr Miranda Discharge Instructions      Handy Miranda M.D.   [phone]  Plastic and Reconstructive Surgery, Hand Surgery  [address] [place] [address], [address]  [place] [address]     GENERAL INSTRUCTIONS:  Do not remove dressing for any reason.  Some drainage (blood and fluid) through the dressing is expected.  Some swelling is normal.  Replace paper tapes only if they come off themselves.  You may wash the area today.       YOU HAVE AN APPOINTMENT AT THE OFFICE ON ____2/23/24_____________    PLEASE CALL THE OFFICE _____________________________________     AND MAKE AN APPOINTMENT FOR ______________________________

## 2024-02-09 ENCOUNTER — HOSPITAL DOCUMENTATION (OUTPATIENT)
Dept: SURGERY | Facility: CLINIC | Age: 74
End: 2024-02-09

## 2024-02-09 ENCOUNTER — HOSPITAL ENCOUNTER (OUTPATIENT)
Facility: HOSPITAL | Age: 74
Setting detail: HOSPITAL OUTPATIENT SURGERY
Discharge: HOME | End: 2024-02-09
Attending: PLASTIC SURGERY | Admitting: PLASTIC SURGERY
Payer: MEDICARE

## 2024-02-09 VITALS
HEART RATE: 62 BPM | TEMPERATURE: 98 F | HEIGHT: 70 IN | DIASTOLIC BLOOD PRESSURE: 74 MMHG | BODY MASS INDEX: 31.35 KG/M2 | WEIGHT: 219 LBS | RESPIRATION RATE: 12 BRPM | OXYGEN SATURATION: 98 % | SYSTOLIC BLOOD PRESSURE: 136 MMHG

## 2024-02-09 DIAGNOSIS — C44.622 SQUAMOUS CELL CARCINOMA OF SKIN OF RIGHT UPPER LIMB, INCLUDING SHOULDER: Primary | ICD-10-CM

## 2024-02-09 DIAGNOSIS — C44.622 SQUAMOUS CELL CARCINOMA OF SKIN OF RIGHT UPPER LIMB, INCLUDING SHOULDER: ICD-10-CM

## 2024-02-09 PROBLEM — Z04.9 OBSERVATION FOR SUSPECTED CONDITION: Status: ACTIVE | Noted: 2024-02-09

## 2024-02-09 PROCEDURE — 0HBDXZZ EXCISION OF RIGHT LOWER ARM SKIN, EXTERNAL APPROACH: ICD-10-PCS | Performed by: PLASTIC SURGERY

## 2024-02-09 PROCEDURE — 11622 EXC S/N/H/F/G MAL+MRG 1.1-2: CPT | Performed by: PLASTIC SURGERY

## 2024-02-09 PROCEDURE — 12032 INTMD RPR S/A/T/EXT 2.6-7.5: CPT | Performed by: PLASTIC SURGERY

## 2024-02-09 RX ORDER — ACETAMINOPHEN 500 MG
500 TABLET ORAL EVERY 4 HOURS PRN
Status: DISCONTINUED | OUTPATIENT
Start: 2024-02-09 | End: 2024-02-09

## 2024-02-09 RX ORDER — LIDOCAINE HYDROCHLORIDE AND EPINEPHRINE 5; 5 MG/ML; UG/ML
INJECTION, SOLUTION INFILTRATION; PERINEURAL AS NEEDED
Status: DISCONTINUED | OUTPATIENT
Start: 2024-02-09 | End: 2024-02-09 | Stop reason: HOSPADM

## 2024-02-09 NOTE — BRIEF OP NOTE
Pre-Operative Diagnosis: Squamous cell carcinoma of skin of right upper limb, including shoulder [C44.622]     Post-Operative Diagnosis: Squamous cell carcinoma of skin of right upper limb, including shoulder [C44.622]      Procedure Performed:   Wide excision lesion right arm with frozen section    Surgeon(s) and Role:     * Handy Miller MD - Primary    Assistant(s):        Surgical Findings: SCC     Specimen: SCC     Estimated Blood Loss: 1 ml    Dictation Number:      Handy Miranda MD  2/9/2024  5:24 PM

## 2024-02-09 NOTE — INTERVAL H&P NOTE
Pre-op Diagnosis: Squamous cell carcinoma of skin of right upper limb, including shoulder [C44.622]    The above referenced H&P was reviewed by Handy Miranda MD on 2/9/2024, the patient was examined and no significant changes have occurred in the patient's condition since the H&P was performed.  I discussed with the patient and/or legal representative the potential benefits, risks and side effects of this procedure; the likelihood of the patient achieving goals; and potential problems that might occur during recuperation.  I discussed reasonable alternatives to the procedure, including risks, benefits and side effects related to the alternatives and risks related to not receiving this procedure.  We will proceed with procedure as planned.

## 2024-02-10 ENCOUNTER — TELEPHONE (OUTPATIENT)
Dept: SURGERY | Facility: CLINIC | Age: 74
End: 2024-02-10

## 2024-02-10 NOTE — TELEPHONE ENCOUNTER
Left message for post-operative patient to please call the office with any questions and/or concerns. Reminded patient of next RN appointment on 2/23.  Dr. Miranda notified.

## 2024-02-10 NOTE — OPERATIVE REPORT
[facility]    PATIENT'S NAME: DAWIT DE OLIVEIRA   ATTENDING PHYSICIAN: Handy Miranda MD   OPERATING PHYSICIAN: Handy Miranda MD   PATIENT ACCOUNT#:   [id number]    LOCATION:  [place] [address]  MEDICAL RECORD #:   [id number]       YOB: 1950  ADMISSION DATE:       02/09/2024      OPERATION DATE:  02/09/2024    OPERATIVE REPORT    PREOPERATIVE DIAGNOSIS:  Squamous cell carcinoma, right arm.  POSTOPERATIVE DIAGNOSIS:  Squamous cell carcinoma, right arm.  PROCEDURE:  Wide excision, squamous cell carcinoma of right arm, intermediate repair.    INDICATIONS:  A 73-year-old male, right-hand dominant, with a 2-month history of a painful, enlarging lesion of the right distal arm.  Biopsy revealed squamous cell carcinoma.  He is admitted to the operating amphitheater for wide excision under frozen section control.    FINDINGS:  An 8 mm, excised diameter 14 mm, hyperkeratotic tender lesion that is present on the posterior distal aspect of the right arm.    OPERATIVE TECHNIQUE:  Local anesthesia was effected with 0.5% lidocaine with 1:200,000 epinephrine.  The area was prepped and draped in the usual sterile fashion.    Lesion was outlined with 3 mm margins:  Excised diameter 14 mm.  This was fashioned into a fusiform excision.  Lesion was excised.  Frozen section revealed margins free of tumor.    Subcutaneous and deep dermal sutures of 4-0 Vicryl were placed.  Skin edges were reapproximated with 4-0 nylon.  Steri-Strips applied.    Patient tolerated the procedure well and left the operating suite in satisfactory condition.    Dictated By Handy Miranda MD  d: 02/09/2024 17:26:33  t: 02/09/2024 23:50:03  [facility] [id number]  J/    cc: MD Bruno Shipley MD Daniel E. Michalik, MD

## 2024-02-23 ENCOUNTER — NURSE ONLY (OUTPATIENT)
Dept: SURGERY | Facility: CLINIC | Age: 74
End: 2024-02-23
Payer: MEDICARE

## 2024-02-23 DIAGNOSIS — Z48.02 ENCOUNTER FOR REMOVAL OF SUTURES: Primary | ICD-10-CM

## 2024-02-23 DIAGNOSIS — C44.622 SQUAMOUS CELL CARCINOMA OF SKIN OF RIGHT UPPER LIMB, INCLUDING SHOULDER: ICD-10-CM

## 2024-02-23 NOTE — PROGRESS NOTES
Surgery 1: SCC R arm  - Date: 02/09/24  - Days Since: 14    Pt here for suture removal to right arm  Pt identified w/2 identifiers and orders verified.  Pt presents w/steri-strip C/D/I.  Pt denies c/o pain, use of analgesics, and s/s infection.  Steri-strip removed carefully.  Running stitch intact with minimal dried blood  Incision appears to be healing well, edges well approximated.  Running stitch removed without difficulty and pt tolerated procedure well.  Site cleansed w/soap and water and Eucerin massage applied  Pt instructed to wash daily with soap and water and begin Eucerin massages.  Eucerin massage demonstrated to pt and \"After Skin Surgery\" pamphlet given.  Pt instructed on importance of sun block use for the first year after surgery.  Pt verbalized an understanding of teaching.  Pt instructed to call the office w/any further questions and/or concerns.  Dr. Miranda notified.     SR R arm

## 2024-04-22 ENCOUNTER — TELEPHONE (OUTPATIENT)
Dept: INTERNAL MEDICINE CLINIC | Facility: CLINIC | Age: 74
End: 2024-04-22

## 2024-04-22 NOTE — TELEPHONE ENCOUNTER
Spoke with patient. Relayed MD message. Pt verbalized understanding.     MyChart msg to pt with MD info per pt request.

## 2024-04-22 NOTE — TELEPHONE ENCOUNTER
Patient calling for name of different urologist. Dr. Gelacio Clancy is too busy.  Has cancelled several appointments with patient. He did see one of his associates.   Patient would prefer referral to a different urology group.    Best call back number [phone]

## 2024-04-22 NOTE — TELEPHONE ENCOUNTER
Please refer to urologist Dr Travis Santiago    Office in [place], though active staff at [address].  [address] [place] [id number] [phone]    Please call pt

## 2024-05-08 ENCOUNTER — OFFICE VISIT (OUTPATIENT)
Dept: DERMATOLOGY CLINIC | Facility: CLINIC | Age: 74
End: 2024-05-08
Payer: MEDICARE

## 2024-05-08 DIAGNOSIS — D48.5 NEOPLASM OF UNCERTAIN BEHAVIOR OF SKIN: Primary | ICD-10-CM

## 2024-05-08 PROCEDURE — 88305 TISSUE EXAM BY PATHOLOGIST: CPT | Performed by: STUDENT IN AN ORGANIZED HEALTH CARE EDUCATION/TRAINING PROGRAM

## 2024-05-08 PROCEDURE — 99213 OFFICE O/P EST LOW 20 MIN: CPT | Performed by: STUDENT IN AN ORGANIZED HEALTH CARE EDUCATION/TRAINING PROGRAM

## 2024-05-08 PROCEDURE — 11102 TANGNTL BX SKIN SINGLE LES: CPT | Performed by: STUDENT IN AN ORGANIZED HEALTH CARE EDUCATION/TRAINING PROGRAM

## 2024-05-08 NOTE — PROGRESS NOTES
May 8, 2024    Established patient     CHIEF COMPLAINT: FBSE    HISTORY OF PRESENT ILLNESS: .    - No particular lesions of concern.       DERM HISTORY:  History of skin cancer: Yes-SCC    FAMILY HISTORY:  History of melanoma: No    PAST MEDICAL HISTORY:  Past Medical History:    BPH (benign prostatic hypertrophy)    Sees KENYA Ayala in [facility]    podiatrist Dr Albright; s/p left bunionectomy 2010    Colon polyps    GI Dr Augustine Estevez; last colonoscopy in 1/4/2010    Family history of coronary artery disease    Sees Dr. Alvarez at [facility] Fax [phone]; last stress test 2012    Hearing impairment    bILATERAL HEARING AIDS    Hypercholesterolemia    Inguinal hernia, bilateral    s/p repair with mesh in Jan 2017;  Dr Sheets    Pulmonary embolism (HCC)    treated with 6 months of xeralto worked up by  dr arteaga    SCC (squamous cell carcinoma)    Right upper arm    Squamous cell carcinoma of arm, right    Visual impairment       REVIEW OF SYSTEMS:  Constitutional: Denies fever, chills, unintentional weight loss.   Skin as per HPI    Medications  Current Outpatient Medications   Medication Sig Dispense Refill    Red Yeast Rice 600 MG Oral Cap       Cholecalciferol (VITAMIN D3) 1000 units Oral Cap Take 1 tablet by mouth daily.      Omega-3 Fatty Acids (FISH OIL) 1200 MG Oral Cap Take 1 capsule by mouth daily.  0    Multiple Vitamins-Minerals (ICAPS) Oral Cap Take 1 capsule by mouth daily. 30 capsule 0       PHYSICAL EXAM:  Patient declined chaperone   General: awake, alert, no acute distress  Skin: Skin exam was performed today including the following: head and face, scalp, neck, chest (including breasts and axillae), abdomen, back, bilateral upper extremities, bilateral lower extremities, hands, feet, digits, nails. Pertinent findings include:   - Scattered bright red-purple dome-shaped papules on the trunk and extremities   - Scattered light brown stellate macules on sun exposed sites  - Scattered, evenly  colored, round brown macules and papules with regular borders on the trunk and extremities  - Numerous scattered skin-colored and brown, waxy, stuck-on papules and plaques on the trunk and extremities  - Lower back with a sclerotic papule     ASSESSMENT & PLAN:  Pathophysiology of diagnoses discussed with patient.  Therapeutic options reviewed. Risks, benefits, and alternatives discussed with patient. Instructions reviewed at length.    #Lentigines  #Seborrheic keratoses   #Cherry angiomas   - Reassurance provided regarding the benign nature of these lesions.    #Multiple benign nevi  - Complete skin exam performed today with no outlier lesions identified   - Reassured patient of benign nature of these lesions.   - Recommend daily photoprotection with broad-spectrum sunscreen, avoidance of sun during peak hours, and sun protective clothing.    - Dermoscopy was used for physical examination of pigmented lesions during today's office visit.    #Neoplasm(s) of uncertain behavior of skin  - Shave biopsy performed today   - Will notify patient with results and arrange for appropriate definitive treatment, if indicated.      Shave of lesion to establish and confirm diagnosis:  Photo taken: Yes    Risks, benefits, alternatives and personnel required for shave biopsy reviewed with patient. Risks discussed include, but not limited to: pain, bleeding, infection, scar, reaction to anesthetic, and recurrence/need for further treatment.  Patient and physician agree as to site(s) to be biopsied. Patient verbalizes understanding and wishes to proceed.     Site(s) prepped with alcohol and anesthetized with 1% lidocaine with epinephrine.   Shave of lesion(s) performed to the level of the dermis. Specimen(s) from A. Lower back  sent for pathology to r/o NMSC 50% ALCL and bandaging applied.   Written and verbal wound care instructions provided to patient, understanding verbalized.      Return to clinic: 6 months or sooner if something  concerning arises     Pasquale Coyne MD

## 2024-05-13 NOTE — PROGRESS NOTES
Results logged in. Patient informed of test results and all KMT's recommendations. Voiced understanding. Appt made

## 2024-05-15 ENCOUNTER — OFFICE VISIT (OUTPATIENT)
Dept: DERMATOLOGY CLINIC | Facility: CLINIC | Age: 74
End: 2024-05-15

## 2024-05-15 DIAGNOSIS — C44.519 BASAL CELL CARCINOMA (BCC) OF BACK: Primary | ICD-10-CM

## 2024-05-15 PROCEDURE — 17263 DSTRJ MAL LES T/A/L 2.1-3.0: CPT | Performed by: STUDENT IN AN ORGANIZED HEALTH CARE EDUCATION/TRAINING PROGRAM

## 2024-05-15 NOTE — PROGRESS NOTES
PROCEDURE NOTE: ELECTRODESSICTION AND CURRETAGE      DIAGNOSIS: Basal Cell Carcinoma    LOCATION: Lower back    INDICATIONS FOR PROCEDURE: This patient presents with the biopsy-proven lesion noted above. Treatment with electrodesiccation and curettage is indicated.     INFORMED CONSENT:  The risks, benefits and anticipated outcomes of the procedure, including scarring, infection, the risks and benefits of the alternatives to the procedure, and the roles and tasks of the personnel to be involved were discussed with the patient.   Informed consent was obtained and patient was given the opportunity to ask further questions about the procedure today and wishes to proceed.    PREOPERATIVE/PROCEDURAL VERIFICATION:  Patient verified by: Name and Date of Birth  Procedural site verified by: Provider marking the site with patient involvement.  Provider physically marking the site: Pasquale Coyne MD  Procedure to be performed: ED&C  Staff involved: Pasquale Coyne MD  Relevant documentation, images,or special equipment present:    [employer]/SAFETY CHECKLIST  Procedure to be performed: electrodesiccation and curettage   Sign in Communication: Completed  Time Out:  Team Confirms the Correct Patient, Correct Procedure, Correct Site and Site Marking, Correct Position (if applicable), Prep and Dry Time (if applicable).  Time:  Few minutes prior to procedure  Affirmation of Time Out: Yes  Sign Out Discussion: Completed    PROCEDURE:  A hyfrecator was used for electrodessication. The lesion is destroyed with 4 mm of normal skin peripheral to the identified margin.   Number of Passes: 3  Pre-operative size: 1.5cm  Post- operative site: 2.1cm    Prep: DM  Anesthesia: Lidocaine 1% with epinephrine, 1:100,000 x 1 ml  EBL: Minimal.   Complications: Patient tolerated the procedure well with no complications  Wound Care: Instructions provided  Dressing: Pressure dressing

## 2024-09-30 ENCOUNTER — OFFICE VISIT (OUTPATIENT)
Dept: INTERNAL MEDICINE CLINIC | Facility: CLINIC | Age: 74
End: 2024-09-30

## 2024-09-30 ENCOUNTER — LAB ENCOUNTER (OUTPATIENT)
Dept: LAB | Age: 74
End: 2024-09-30
Attending: INTERNAL MEDICINE
Payer: MEDICARE

## 2024-09-30 VITALS
BODY MASS INDEX: 31.16 KG/M2 | HEART RATE: 56 BPM | TEMPERATURE: 98 F | HEIGHT: 70 IN | SYSTOLIC BLOOD PRESSURE: 116 MMHG | DIASTOLIC BLOOD PRESSURE: 68 MMHG | WEIGHT: 217.63 LBS | OXYGEN SATURATION: 98 %

## 2024-09-30 DIAGNOSIS — Z12.5 SCREENING PSA (PROSTATE SPECIFIC ANTIGEN): ICD-10-CM

## 2024-09-30 DIAGNOSIS — C44.91 BASAL CELL CARCINOMA (BCC), UNSPECIFIED SITE: ICD-10-CM

## 2024-09-30 DIAGNOSIS — H91.90 HEARING LOSS, UNSPECIFIED HEARING LOSS TYPE, UNSPECIFIED LATERALITY: ICD-10-CM

## 2024-09-30 DIAGNOSIS — E78.00 HYPERCHOLESTEREMIA: ICD-10-CM

## 2024-09-30 DIAGNOSIS — R53.83 OTHER FATIGUE: ICD-10-CM

## 2024-09-30 DIAGNOSIS — K63.5 POLYP OF COLON, UNSPECIFIED PART OF COLON, UNSPECIFIED TYPE: ICD-10-CM

## 2024-09-30 DIAGNOSIS — Z00.00 PHYSICAL EXAM, ANNUAL: Primary | ICD-10-CM

## 2024-09-30 LAB
ALBUMIN SERPL-MCNC: 4.2 G/DL (ref 3.2–4.8)
ALBUMIN/GLOB SERPL: 1.5 {RATIO} (ref 1–2)
ALP LIVER SERPL-CCNC: 52 U/L
ALT SERPL-CCNC: 18 U/L
ANION GAP SERPL CALC-SCNC: 8 MMOL/L (ref 0–18)
AST SERPL-CCNC: 22 U/L (ref ?–34)
BASOPHILS # BLD AUTO: 0.04 X10(3) UL (ref 0–0.2)
BASOPHILS NFR BLD AUTO: 0.8 %
BILIRUB SERPL-MCNC: 1.4 MG/DL (ref 0.2–1.1)
BUN BLD-MCNC: 15 MG/DL (ref 9–23)
BUN/CREAT SERPL: 14.4 (ref 10–20)
CALCIUM BLD-MCNC: 9.1 MG/DL (ref 8.7–10.4)
CHLORIDE SERPL-SCNC: 107 MMOL/L (ref 98–112)
CHOLEST SERPL-MCNC: 179 MG/DL (ref ?–200)
CO2 SERPL-SCNC: 25 MMOL/L (ref 21–32)
COMPLEXED PSA SERPL-MCNC: 0.75 NG/ML (ref ?–4)
CREAT BLD-MCNC: 1.04 MG/DL
DEPRECATED RDW RBC AUTO: 46.1 FL (ref 35.1–46.3)
EGFRCR SERPLBLD CKD-EPI 2021: 76 ML/MIN/1.73M2 (ref 60–?)
EOSINOPHIL # BLD AUTO: 0.17 X10(3) UL (ref 0–0.7)
EOSINOPHIL NFR BLD AUTO: 3.4 %
ERYTHROCYTE [DISTWIDTH] IN BLOOD BY AUTOMATED COUNT: 13.5 % (ref 11–15)
FASTING PATIENT LIPID ANSWER: YES
FASTING STATUS PATIENT QL REPORTED: YES
GLOBULIN PLAS-MCNC: 2.8 G/DL (ref 2–3.5)
GLUCOSE BLD-MCNC: 107 MG/DL (ref 70–99)
HCT VFR BLD AUTO: 44.7 %
HDLC SERPL-MCNC: 58 MG/DL (ref 40–59)
HGB BLD-MCNC: 15.5 G/DL
IMM GRANULOCYTES # BLD AUTO: 0.01 X10(3) UL (ref 0–1)
IMM GRANULOCYTES NFR BLD: 0.2 %
LDLC SERPL CALC-MCNC: 112 MG/DL (ref ?–100)
LYMPHOCYTES # BLD AUTO: 1.96 X10(3) UL (ref 1–4)
LYMPHOCYTES NFR BLD AUTO: 39.4 %
MCH RBC QN AUTO: 31.6 PG (ref 26–34)
MCHC RBC AUTO-ENTMCNC: 34.7 G/DL (ref 31–37)
MCV RBC AUTO: 91 FL
MONOCYTES # BLD AUTO: 0.56 X10(3) UL (ref 0.1–1)
MONOCYTES NFR BLD AUTO: 11.3 %
NEUTROPHILS # BLD AUTO: 2.23 X10 (3) UL (ref 1.5–7.7)
NEUTROPHILS # BLD AUTO: 2.23 X10(3) UL (ref 1.5–7.7)
NEUTROPHILS NFR BLD AUTO: 44.9 %
NONHDLC SERPL-MCNC: 121 MG/DL (ref ?–130)
OSMOLALITY SERPL CALC.SUM OF ELEC: 291 MOSM/KG (ref 275–295)
PLATELET # BLD AUTO: 288 10(3)UL (ref 150–450)
POTASSIUM SERPL-SCNC: 4.8 MMOL/L (ref 3.5–5.1)
PROT SERPL-MCNC: 7 G/DL (ref 5.7–8.2)
RBC # BLD AUTO: 4.91 X10(6)UL
SODIUM SERPL-SCNC: 140 MMOL/L (ref 136–145)
TRIGL SERPL-MCNC: 44 MG/DL (ref 30–149)
TSI SER-ACNC: 2.81 MIU/ML (ref 0.55–4.78)
VLDLC SERPL CALC-MCNC: 8 MG/DL (ref 0–30)
WBC # BLD AUTO: 5 X10(3) UL (ref 4–11)

## 2024-09-30 PROCEDURE — 84443 ASSAY THYROID STIM HORMONE: CPT

## 2024-09-30 PROCEDURE — 36415 COLL VENOUS BLD VENIPUNCTURE: CPT

## 2024-09-30 PROCEDURE — 80053 COMPREHEN METABOLIC PANEL: CPT

## 2024-09-30 PROCEDURE — 80061 LIPID PANEL: CPT

## 2024-09-30 PROCEDURE — 85025 COMPLETE CBC W/AUTO DIFF WBC: CPT

## 2024-09-30 PROCEDURE — G0439 PPPS, SUBSEQ VISIT: HCPCS | Performed by: INTERNAL MEDICINE

## 2024-09-30 NOTE — PROGRESS NOTES
Luis Chapa is a 73 year old male.    HPI:     Chief Complaint   Patient presents with    Physical     Annual Medicare Px   chief complaint: presents for both AWV and follow up for chronic conditions and/or medication refills,      74 y/o M here for subsequent Medicare annual wellness exam; found to have a basal cell carcinoma S/p excision to back in May 2024 by dermatologist, Dr Coyne;  no CP; no SOB; no headaches; no palpitation          HISTORY:  Past Medical History:    BCC (basal cell carcinoma)    lower back    BCC (basal cell carcinoma)    lower back    BPH (benign prostatic hypertrophy)    Sees  Dr. Ayala in [facility]    podiatrist Dr Albright; s/p left bunionectomy 2010    Colon polyps    GI Dr Augustine Estevez; last colonoscopy in 1/4/2010    Family history of coronary artery disease    Sees Dr. Alvarez at [facility] Fax [phone]; last stress test 2012    Hearing impairment    bILATERAL HEARING AIDS    Hypercholesterolemia    Inguinal hernia, bilateral    s/p repair with mesh in Jan 2017;  Dr Sheets    Pulmonary embolism (HCC)    treated with 6 months of xeralto worked up by  dr arteaga    SCC (squamous cell carcinoma)    Right upper arm    Squamous cell carcinoma of arm, right    Visual impairment      Past Surgical History:   Procedure Laterality Date    Colonoscopy  01/01/2009    Polyps-Repeat 2014    Colonoscopy  01/01/2004    Polyps    Colonoscopy  10/2015    Colonoscopy  09/2020    Colonoscopy N/A 09/30/2020    Procedure: COLONOSCOPY, POSSIBLE BIOPSY, POSSIBLE POLYPECTOMY 83500;  Surgeon: Venkatesh Estevez MD;  Location: [facility]    Exc skin malig 1.1-2cm remaindr body Right 02/09/2024    Wide excision Right arm    Hernia surgery  01/24/2017    bilat ingunial hernia    Other surgical history      Right bunion surgery with Dr. Albright    Vasectomy        Family History   Problem Relation Age of Onset    Heart Disorder Father 45        MI    Diabetes Father     Cancer  Father         Prostate CA    Other (Other) Father     Heart Disorder Mother          at age 97    Other (Other) Sister         PBC    Heart Disorder Paternal Grandfather 45        mi    Other (liver disease) Sister     Other (alive and well) Sister         x3    Obesity Brother     Other (CVA) Daughter 38    Other (alive and well) Daughter     Other (alive and well) Son       Social History:   Social History     Socioeconomic History    Marital status:     Number of children: 3   Occupational History    Occupation: CPA   Tobacco Use    Smoking status: Former     Current packs/day: 0.00     Average packs/day: 0.5 packs/day for 20.0 years (10.0 ttl pk-yrs)     Types: Pipe, Cigarettes     Start date: 1975     Quit date: 1995     Years since quittin.8     Passive exposure: Past    Smokeless tobacco: Never    Tobacco comments:     saira   Vaping Use    Vaping status: Never Used   Substance and Sexual Activity    Alcohol use: Yes     Alcohol/week: 14.0 standard drinks of alcohol     Types: 7 Glasses of wine, 7 Shots of liquor per week     Comment: Daily glass of wine    Drug use: No   Other Topics Concern     Service No    Caffeine Concern Yes     Comment: 3 cups coffee per day    Occupational Exposure No    History of tanning Yes    Pt has a pacemaker No    Pt has a defibrillator No    Reaction to local anesthetic No    Right Handed Yes        Medications (Active prior to today's visit):  Current Outpatient Medications   Medication Sig Dispense Refill    Cholecalciferol (VITAMIN D3) 1000 units Oral Cap Take 1 tablet by mouth daily.      Omega-3 Fatty Acids (FISH OIL) 1200 MG Oral Cap Take 1 capsule by mouth daily.  0    Multiple Vitamins-Minerals (ICAPS) Oral Cap Take 1 capsule by mouth daily. 30 capsule 0       Allergies:  No Known Allergies        General Health     In the past six months, have you lost more than 10 pounds without trying?: 2 - No    Has your appetite been poor?:  No    Type of Diet: Balanced    How does the patient maintain a good energy level?: Daily Walks    How would you describe your daily physical activity?: Moderate    How would you describe your current health state?: Fair    How do you maintain positive mental well-being?: Social Interaction;Visiting Friends;Visiting Family         Have you had any immunizations at another office such as Influenza, Hepatitis B, Tetanus, or Pneumococcal?: No     Functional Ability     Bathing or Showering: Able without help    Toileting: Able without help    Dressing: Able without help    Eating: Able without help    Driving: Able without help    Preparing your meals: Able without help    Managing money/bills: Able without help    Taking medications as prescribed: Able without help    Are you able to afford your medications?: Yes    Hearing Problems?: Yes     Functional Status     Hearing Problems?: Yes    Vision Problems? : Yes    Difficulty walking?: No    Difficulty dressing or bathing?: No    Problems with daily activities? : No    Memory Problems?: No      Fall/Risk Assessment          Have you fallen in the last 12 months?: 0-No                              Fall/Risk Scorin         Depression Screening (PHQ-2/PHQ-9): Over the LAST 2 WEEKS   Little interest or pleasure in doing things (over the last two weeks)?: Not at all     Feeling down, depressed, or hopeless (over the last two weeks)?: Not at all     PHQ-2 SCORE: 0         Advance Directives     Do you have a healthcare power of ?: No    Do you have a living will?: No     Hearing Assessment (Required for AWV/SWV)      Hearing Screening    Screening Method: Whisper Test  Whisper Test Result: Pass         Visual Acuity                   Cognitive Assessment     What day of the week is this?: Correct    What month is it?: Correct    What year is it?: Correct    Recall \"Ball\": Correct    Recall \"Flag\": Correct    Recall \"Tree\": Correct        Luis Chapa's  SCREENING SCHEDULE   Tests on this list are recommended by your physician but may not be covered, or covered at this frequency, by your insurer. Please check with your insurance carrier before scheduling to verify coverage.    PREVENTATIVE SERVICES  INDICATIONS AND SCHEDULE Internal Lab or Procedure External Lab or Procedure   Diabetes Screening      HbgA1C   Annually No results found for: \"A1C\"      No data to display                Fasting Blood Sugar (FSB) Annually Glucose (mg/dL)   Date Value   09/30/2024 107 (H)   12/15/2011 96       Cardiovascular Disease Screening     LDL Annually LDL Cholesterol Calc (mg/dL)   Date Value   12/15/2011 100 (H)     LDL Cholesterol (mg/dL)   Date Value   09/30/2024 112 (H)     Calculated LDL (mg/dL)   Date Value   04/17/2015 123.8 (H)        EKG One Time     Colorectal Cancer Screening      Colonoscopy Screen every 10 years Health Maintenance   Topic Date Due    Colorectal Cancer Screening  09/30/2027    Update Health Maintenance if applicable    Flex Sigmoidoscopy Screen every 5 years No results found for this or any previous visit.      No data to display                 Fecal Occult Blood Annually No results found for: \"FOB\", \"OCCULTSTOOL\"      No data to display                Glaucoma Screening      Ophthalmology Visit Annually     Prostate Cancer Screening      PSA  Annually Health Maintenance   Topic Date Due    PSA  09/30/2025     Update Health Maintenance if applicable   Immunizations      Influenza Orders placed or performed in visit on 12/13/11    Influenza virus vaccine >= 3 years [13409]    Update Immunization Activity if applicable    Pneumococcal Orders placed or performed in visit on 08/27/20    PNEUMOCOCCAL IMM, 23   Orders placed or performed in visit on 08/22/19    PNEUMOCOCCAL VACC, 13 LAI IM    Update Immunization Activity if applicable    Hepatitis B Orders placed or performed in visit on 01/24/19    HEPATITIS B VACCINE, OVER 20   Orders placed or performed  in visit on 09/07/18    HEPATITIS B VACCINE, OVER 20   Orders placed or performed in visit on 07/16/18    HEPATITIS B VACCINE, OVER 20    Update Immunization Activity if applicable    Tetanus No orders found for this or any previous visit. Update Immunization Activity if applicable    Zoster (Not covered by Medicare Part B) No orders found for this or any previous visit. Update Immunization Activity if applicable     SPECIFIC DISEASE MONITORING Internal Lab or Procedure External Lab or Procedure   Annual Monitoring of Persistent     Medications (ACE/ARB, digoxin, diuretics)    Potassium  Annually Potassium (mmol/L)   Date Value   09/30/2024 4.8   12/15/2011 4.5     POTASSIUM (P) (mmol/L)   Date Value   08/24/2016 4.9         No data to display                Creatinine  Annually Creatinine (mg/dL)   Date Value   09/30/2024 1.04     Creatinine, Serum (mg/dL)   Date Value   12/15/2011 1.05         No data to display                Digoxin Serum Conc  Annually No results found for: \"DIGOXIN\"      No data to display                Diabetes      HgbA1C  Annually No results found for: \"A1C\"      No data to display                Creat/alb ratio  Annually      LDL  Annually LDL Cholesterol Calc (mg/dL)   Date Value   12/15/2011 100 (H)     LDL Cholesterol (mg/dL)   Date Value   09/30/2024 112 (H)     Calculated LDL (mg/dL)   Date Value   04/17/2015 123.8 (H)         No data to display                 Dilated Eye exam  Annually      No data to display                   No data to display                COPD      Spirometry Testing Annually No results found for this or any previous visit.      No data to display                        ROS:   Constitutional: no weight loss; no fatigue  ENMT:  Negative for ear drainage, hearing loss and nasal drainage  Eyes:  Negative for eye discharge and vision loss  Cardiovascular:  Negative for chest pain; negative palpitations  Respiratory:  Negative for cough, dyspnea and  wheezing  Endocrine:  Negative for abnormal sleep patterns, increased activity, polydipsia and polyphagia  Gastrointestinal:  Negative for abdominal pain, constipation, decreased appetite, diarrhea and vomiting; no melena or hematochezia  Musculoskeletal:  Negative for arthralgias or myalgias  Genitourinary:  Negative for dysuria or polyuria  Hema/Lymph:  Negative for easy bleeding and easy bruising  Integumentary:  Negative for pruritus and rash  Neurological:  Negative for gait disturbance; negative for paresthesias   All other review of systems are negative.        PHYSICAL EXAM:   Blood pressure 116/68, pulse 56, temperature 98.2 °F (36.8 °C), height 5' 10\" (1.778 m), weight 217 lb 9.6 oz (98.7 kg), SpO2 98%.  Constitutional: alert and oriented x3 in no acute distress  HEENT- EOMI, PERRL  Nose/Mouth/Throat: pharynx without erythema; no oral lesions  Neck/Thyroid: neck supple; no thyromegaly  Lymphatics: no lymphadenopathy of neck or groin  Cardiovascular: RRR, S1, S2, no S3 or murmur  Respiratory: lungs without crackles or wheezes  Abdomen: normoactive bowel sounds, soft, non-tender and non-distended  Extremities: no clubbing, cyanosis or edema  Vascular: no carotid bruits; DP/PT 2/2  Musculoskeletal: Motor 5/5 upper and lower extremities  Neurological: cranial nerves II-XII intact; light touch and proprioception intact  Skin: no rash or ulcerations         ASSESSMENT/PLAN:   Physical exam  Prevnar 13 give m in 2019; Pneumovax 23 given Aug 2020; sees ophtho Dr Marcelino Gutierres for annual eye exam; check CBC, CMP, TSH, Lipids     History of bilateral inguinal hernia repair  S/p repair with mesh in Jan 2017 per Dr Clark     Asthma   on Ventolin HFA 2 p QID prn; uses rarely; consider inhaled steroid in future; remote smoking (quit 1995, previous pipe smoker x 20 years)     History of Pulmonary embolus (HCC)  Dx 4/9/15 with multiple scattered bilateral  PEs; venous Doppler neg for DVT; PE suspected to be exacerbated  by immobility at work; completed Xarelto x 6 mos; w/u for lupus anticoagulant and ATIII negative; Xarelto stopped in Oct 2015; as noted by Dr Patterson, no further w/u and no further need for anticoagulation unless he has a subsequent event; pt advised for VTE prophylaxis during high risk situations     Hypercholesterolemia    in Sept 2022; CT calcium score was 1 in Aug 2017     PSA screening  PSA 1.01 in May 2023     Family history of CAD  Last stress test in 2012     Colon polyps  Pt had colonoscopy on 9/30/20 by Dr Estevez; next colonoscopy in 7-10 years per Dr Estevez     Large nevus  To mid-abdomen; 14 x 12 mm in size; pt seen by dermatologist Dr Egan in 2017; was advised for observation; no change in size     Aortic atherosclerosis  Seen on CT chest on 2015     History of left lateral epicondylitis  Improved; F/U Dr Lynch     Hearing loss  Sees ENT Dr Melgoza; has hearing aides    Basal cell carcinoma  S/p excision to back in May 2024; F/U Derm Dr Tran         RTC 1 year  MWE 9/30/24  Cell [phone]     Spent 30 minutes obtaining history, evaluating patient, discussing treatment options, diet, exercise, review of available labs and radiology reports, and completing documentation.             Orders This Visit:  No orders of the defined types were placed in this encounter.      Meds This Visit:  Requested Prescriptions      No prescriptions requested or ordered in this encounter       Imaging & Referrals:  None     9/30/2024  Bruno Sinclair MD

## 2024-11-13 ENCOUNTER — OFFICE VISIT (OUTPATIENT)
Dept: DERMATOLOGY CLINIC | Facility: CLINIC | Age: 74
End: 2024-11-13
Payer: MEDICARE

## 2024-11-13 DIAGNOSIS — D22.9 MULTIPLE BENIGN NEVI: ICD-10-CM

## 2024-11-13 DIAGNOSIS — L82.1 SEBORRHEIC KERATOSES: Primary | ICD-10-CM

## 2024-11-13 DIAGNOSIS — L91.0 HYPERTROPHIC SCAR: ICD-10-CM

## 2024-11-13 DIAGNOSIS — D18.01 CHERRY ANGIOMA: ICD-10-CM

## 2024-11-13 DIAGNOSIS — L81.4 LENTIGINES: ICD-10-CM

## 2024-11-13 PROCEDURE — 99214 OFFICE O/P EST MOD 30 MIN: CPT | Performed by: STUDENT IN AN ORGANIZED HEALTH CARE EDUCATION/TRAINING PROGRAM

## 2024-11-13 NOTE — PROGRESS NOTES
November 13, 2024    Established patient     Referred by:   No referring provider defined for this encounter.     CHIEF COMPLAINT: UBSE    HISTORY OF PRESENT ILLNESS: .      - No particular lesions of concern.       DERM HISTORY:  History of skin cancer: Yes-BCC-lower back, SCC-Rt upper arm    FAMILY HISTORY:  History of melanoma: No    PAST MEDICAL HISTORY:  Past Medical History:    BCC (basal cell carcinoma)    lower back    BCC (basal cell carcinoma)    lower back    BPH (benign prostatic hypertrophy)    Sees  Dr. Ayala in [facility]    podiatrist Dr Albright; s/p left bunionectomy 2010    Colon polyps    GI Dr Augustine Estevez; last colonoscopy in 1/4/2010    Family history of coronary artery disease    Sees Dr. Alvarez at [facility] Fax [phone]; last stress test 2012    Hearing impairment    bILATERAL HEARING AIDS    Hypercholesterolemia    Inguinal hernia, bilateral    s/p repair with mesh in Jan 2017;  Dr Sheets    Pulmonary embolism (HCC)    treated with 6 months of xeralto worked up by  dr arteaga    SCC (squamous cell carcinoma)    Right upper arm    Squamous cell carcinoma of arm, right    Visual impairment       REVIEW OF SYSTEMS:  Constitutional: Denies fever, chills, unintentional weight loss.   Skin as per HPI    Medications  Current Outpatient Medications   Medication Sig Dispense Refill    Cholecalciferol (VITAMIN D3) 1000 units Oral Cap Take 1 tablet by mouth daily.      Omega-3 Fatty Acids (FISH OIL) 1200 MG Oral Cap Take 1 capsule by mouth daily.  0    Multiple Vitamins-Minerals (ICAPS) Oral Cap Take 1 capsule by mouth daily. 30 capsule 0       PHYSICAL EXAM:  Patient declined chaperone   General: awake, alert, no acute distress  Skin: Skin exam was performed today including the following: head and face, scalp, neck, chest (including breasts and axillae), abdomen, back, bilateral upper extremities, bilateral lower extremities, hands, feet, digits, nails. Pertinent findings include:   -  Scattered bright red-purple dome-shaped papules on the trunk and extremities   - Scattered light brown stellate macules on sun exposed sites  - Scattered, evenly colored, round brown macules and papules with regular borders on the trunk and extremities  - Numerous scattered skin-colored and brown, waxy, stuck-on papules and plaques on the trunk and extremities      ASSESSMENT & PLAN:  Pathophysiology of diagnoses discussed with patient.  Therapeutic options reviewed. Risks, benefits, and alternatives discussed with patient. Instructions reviewed at length.    #Lentigines  #Seborrheic keratoses   #Cherry angiomas   - Reassurance provided regarding the benign nature of these lesions.    #Multiple benign nevi  - Complete skin exam performed today with no outlier lesions identified   - Reassured patient of benign nature of these lesions.   - Recommend daily photoprotection with broad-spectrum sunscreen, avoidance of sun during peak hours, and sun protective clothing.    - Dermoscopy was used for physical examination of pigmented lesions during today's office visit.    #Hypertrophic scar  - Intralesional Kenalog (triamcinolone) injection today - N/C    Concentration: 40 mg/mL  Site: bacj  Total volume injected: 0.2cc    Patient was counseled on the possible side effects of injection: pain at site, infection, atrophy of skin, systemic absorption, hypopigmentation, loss of hair.  Questions if any were addressed before proceeding with prep of the site with rubbing alcohol.  Kenalog was injected with sterile syringe and 25 g needle.  Patient tolerated well.     #History of nonmelanoma skin cancer  - No evidence of recurrence on exam. Continued monitoring. Regular skin exams discussed given increased risk of subsequent cutaneous malignancies.   - Should any areas change in size, shape or color, bleed or become tender, the patient will contact the office for evaluation sooner than their interval appointment.      Return to  clinic: 6 months or sooner if something concerning arises     Pasquale Coyne MD

## 2025-01-14 ENCOUNTER — TELEPHONE (OUTPATIENT)
Dept: INTERNAL MEDICINE CLINIC | Facility: CLINIC | Age: 75
End: 2025-01-14

## 2025-01-14 NOTE — TELEPHONE ENCOUNTER
Copy of patient's 9/30/24 faxed to [facility] Attn: Estela at [phone]. Fax confirmation received.

## 2025-01-14 NOTE — TELEPHONE ENCOUNTER
Estela from [facility] requests copy of 9-30-24 lab results   Fax# [phone]    Patient is there today for an appointment

## 2025-05-07 ENCOUNTER — OFFICE VISIT (OUTPATIENT)
Dept: DERMATOLOGY CLINIC | Facility: CLINIC | Age: 75
End: 2025-05-07
Payer: MEDICARE

## 2025-05-07 DIAGNOSIS — D22.9 MULTIPLE BENIGN NEVI: ICD-10-CM

## 2025-05-07 DIAGNOSIS — D18.01 CHERRY ANGIOMA: ICD-10-CM

## 2025-05-07 DIAGNOSIS — L81.4 LENTIGINES: ICD-10-CM

## 2025-05-07 DIAGNOSIS — L82.1 SEBORRHEIC KERATOSES: Primary | ICD-10-CM

## 2025-05-07 PROCEDURE — 99213 OFFICE O/P EST LOW 20 MIN: CPT | Performed by: STUDENT IN AN ORGANIZED HEALTH CARE EDUCATION/TRAINING PROGRAM

## 2025-05-07 NOTE — PROGRESS NOTES
Established patient     Referred by:   No referring provider defined for this encounter.     CHIEF COMPLAINT: FBSE    HISTORY OF PRESENT ILLNESS: .      - No particular lesions of concern.       DERM HISTORY:  History of skin cancer: Yes-BCC-lower back, SCC-Rt upper arm    FAMILY HISTORY:  History of melanoma: No    PAST MEDICAL HISTORY:  Past Medical History:    BCC (basal cell carcinoma)    lower back    BCC (basal cell carcinoma)    lower back    BPH (benign prostatic hypertrophy)    Sees  Dr. Ayala in [facility]    podiatrist Dr Albright; s/p left bunionectomy 2010    Cataract    Colon polyps    GI Dr Augustine Estevez; last colonoscopy in 1/4/2010    Family history of coronary artery disease    Sees Dr. Alvarez at [email or web address] Fax [phone]; last stress test 2012    Hearing impairment    bILATERAL HEARING AIDS    Hypercholesterolemia    Inguinal hernia, bilateral    s/p repair with mesh in Jan 2017;  Dr Sheets    Pulmonary embolism (HCC)    treated with 6 months of xeralto worked up by  dr arteaga    SCC (squamous cell carcinoma)    Right upper arm    Squamous cell carcinoma of arm, right    Visual impairment       REVIEW OF SYSTEMS:  Constitutional: Denies fever, chills, unintentional weight loss.   Skin as per HPI    Medications  Current Outpatient Medications   Medication Sig Dispense Refill    Cholecalciferol (VITAMIN D3) 1000 units Oral Cap Take 1 tablet by mouth daily.      Omega-3 Fatty Acids (FISH OIL) 1200 MG Oral Cap Take 1 capsule by mouth daily.  0    Multiple Vitamins-Minerals (ICAPS) Oral Cap Take 1 capsule by mouth daily. 30 capsule 0       PHYSICAL EXAM:  Patient declined chaperone   General: awake, alert, no acute distress  Skin: Skin exam was performed today including the following: head and face, scalp, neck, chest (including breasts and axillae), abdomen, back, bilateral upper extremities, bilateral lower extremities, hands, feet, digits, nails. Pertinent findings include:   - Scattered  bright red-purple dome-shaped papules on the trunk and extremities   - Scattered light brown stellate macules on sun exposed sites  - Scattered, evenly colored, round brown macules and papules with regular borders on the trunk and extremities  - Numerous scattered skin-colored and brown, waxy, stuck-on papules and plaques on the trunk and extremities      ASSESSMENT & PLAN:  Pathophysiology of diagnoses discussed with patient.  Therapeutic options reviewed. Risks, benefits, and alternatives discussed with patient. Instructions reviewed at length.    #Lentigines  #Seborrheic keratoses   #Cherry angiomas   - Reassurance provided regarding the benign nature of these lesions.    #Multiple benign nevi  - Complete skin exam performed today with no outlier lesions identified   - Reassured patient of benign nature of these lesions.   - Recommend daily photoprotection with broad-spectrum sunscreen, avoidance of sun during peak hours, and sun protective clothing.    - Dermoscopy was used for physical examination of pigmented lesions during today's office visit.    #History of nonmelanoma skin cancer  - No evidence of recurrence on exam. Continued monitoring. Regular skin exams discussed given increased risk of subsequent cutaneous malignancies.   - Should any areas change in size, shape or color, bleed or become tender, the patient will contact the office for evaluation sooner than their interval appointment.      Return to clinic: 6 months or sooner if something concerning arises     Pasquale Coyne MD    By signing my name below, I, Kaykay KLEIN MA,  attest that this documentation has been prepared under the direction and in the presence of Pasquale Coyne MD.   Electronically Signed: Kaykay KLEIN MA, 5/7/2025, 8:53 AM.    IPasquale MD,  personally performed the services described in this documentation. All medical record entries made by the scribe were at my direction and in my presence.  I have reviewed the chart  and agree that the record reflects my personal performance and is accurate and complete.  Pasquale Coyne MD, 5/7/2025, 11:23 AM

## 2025-06-30 ENCOUNTER — TELEPHONE (OUTPATIENT)
Dept: INTERNAL MEDICINE CLINIC | Facility: CLINIC | Age: 75
End: 2025-06-30

## 2025-06-30 NOTE — TELEPHONE ENCOUNTER
Called patient and left message notifying patient that 10/1/25 appointment with Dr. Sinclair was canceled due to practice closing effective 6/30/25.     Provided patient with Dr. Kiara Arthur's phone number to schedule an appointment.

## 2025-10-01 ENCOUNTER — APPOINTMENT (OUTPATIENT)
Age: 75
End: 2025-10-01

## (undated) DEVICE — COVER STND 54X23IN MAYO REINF

## (undated) DEVICE — MEGASUTURECUT ND: Brand: ENDOWRIST;DAVINCI SI

## (undated) DEVICE — GLOVE GAMMEX PI HYBRID LF 7.0

## (undated) DEVICE — 9534HP TRANSPARENT DRSG W/FRAME: Brand: 3M™ TEGADERM™

## (undated) DEVICE — TRAY FOLEY BDX 16F STATLOCK

## (undated) DEVICE — STERILE LATEX POWDER-FREE SURGICAL GLOVESWITH NITRILE COATING: Brand: PROTEXIS

## (undated) DEVICE — UNDYED BRAIDED (POLYGLACTIN 910), SYNTHETIC ABSORBABLE SUTURE: Brand: COATED VICRYL

## (undated) DEVICE — SOLUTION IRRIG 1000ML 0.9% NACL USP BTL

## (undated) DEVICE — DRAPE SHEET TRANSVERSE LAP

## (undated) DEVICE — SUTURE VICRYL 0 J906G

## (undated) DEVICE — Device

## (undated) DEVICE — SUTURE ETHILON 4-0 P-3

## (undated) DEVICE — TIP COVER ACCESSORY

## (undated) DEVICE — DRESSING TRNSPAR W4XL4.75IN FRME STYL

## (undated) DEVICE — DRAPE SHEET LAPCHOLE 124X100X7

## (undated) DEVICE — DV KIT ACCESSORY 3-ARM DISP

## (undated) DEVICE — COVER SGL STRL LGHT HNDL BLU

## (undated) DEVICE — SOL  .9 1000ML BTL

## (undated) DEVICE — SUTURE SILK 2-0 SH

## (undated) DEVICE — INSUFFLATION NEEDLE TO ESTABLISH PNEUMOPERITONEUM.: Brand: INSUFFLATION NEEDLE

## (undated) DEVICE — ROBOTIC: Brand: MEDLINE INDUSTRIES, INC.

## (undated) DEVICE — MONOPOLAR CURVED SCISSORS: Brand: ENDOWRIST;DAVINCI SI

## (undated) DEVICE — STAPLER SKIN ROTICULATING PRW3

## (undated) DEVICE — [HIGH FLOW INSUFFLATOR,  DO NOT USE IF PACKAGE IS DAMAGED,  KEEP DRY,  KEEP AWAY FROM SUNLIGHT,  PROTECT FROM HEAT AND RADIOACTIVE SOURCES.]: Brand: PNEUMOSURE

## (undated) DEVICE — PACK CDS PLASTIC HAND

## (undated) DEVICE — TROCAR KII OPTICAL ACCESS SYST

## (undated) DEVICE — GOWN SURG AERO BLUE PERF LG

## (undated) DEVICE — CADIERE FORCEPS: Brand: ENDOWRIST;DAVINCI SI

## (undated) DEVICE — VISUALIZATION SYSTEM: Brand: CLEARIFY

## (undated) DEVICE — DV SEAL CANNULA 8.5-13MM

## (undated) DEVICE — SPONGE LAP 18X18 XRAY STRL

## (undated) DEVICE — TROCAR: Brand: KII FIOS FIRST ENTRY

## (undated) DEVICE — SUTURE VLOC 90 2-0 9\" 2145

## (undated) DEVICE — SUTURE SILK 0 SH

## (undated) DEVICE — STRIP SKIN CLSR W1INXL5IN REINF NWVN FAB WHT

## (undated) NOTE — IP AVS SNAPSHOT
[facility]    [address], [place], [place] ~ [phone]                Discharge Summary   1/24/2017    Kody Lacey           Admission Information        Provider Department    1/24/2017 Truman Martinez MD [facility] 12 hours as needed. Prescription pain medication can make you nauseated, light-headed and constipated: it should be taken with food and discontinued if side-effects develop. A stool softener such as Colace is helpful to avoid constipation.   If  no bowel · Avoid constipation:  · Eat fruits, vegetables, and whole grains   · Drink 6–8 glasses of water a day, unless otherwise instructed. · Use a laxative or a mild stool softener as instructed by your doctor.   Call your doctor immediately if you have any of t · To feel muscle aches or soreness especially in the abdomen, chest or neck. The achy feeling should go away in the next 24 hours  · To feel weak, sleepy or \"wiped out\". Your should start feeling better in the next 24 hours.    · To experience mild discom [facility] HOME CARE INSTRUCTIONS: POST-OP ANESTHESIA  The medication that you received for sedation or general anesthesia can last up to 24 hours. Your judgmentand reflexes may be altered, even if you feel like your normal self.       We Recommend:   · Do not DISCHARGE INSTRUCTIONS: AFTER YOUR SURGERY (ENGLISH)      Instructions and Information about Your Health     None      Follow-up Information     Follow up with Carmine Newton MD. Schedule an appointment as soon as possible for a visit in 1 week.     Hayden Matta You can access your MyChart to more actively manage your health care and view more details from this visit by going to [email or web address].   If you've recently had a stay at the Hospital you can access your discharge instructions in [address] by sahara